# Patient Record
Sex: FEMALE | Race: WHITE | NOT HISPANIC OR LATINO | Employment: FULL TIME | URBAN - METROPOLITAN AREA
[De-identification: names, ages, dates, MRNs, and addresses within clinical notes are randomized per-mention and may not be internally consistent; named-entity substitution may affect disease eponyms.]

---

## 2023-08-03 ENCOUNTER — OFFICE VISIT (OUTPATIENT)
Dept: OBSTETRICS AND GYNECOLOGY | Facility: CLINIC | Age: 48
End: 2023-08-03
Payer: COMMERCIAL

## 2023-08-03 VITALS
HEIGHT: 64 IN | SYSTOLIC BLOOD PRESSURE: 118 MMHG | DIASTOLIC BLOOD PRESSURE: 62 MMHG | WEIGHT: 151.4 LBS | BODY MASS INDEX: 25.85 KG/M2

## 2023-08-03 DIAGNOSIS — Z01.419 WOMEN'S ANNUAL ROUTINE GYNECOLOGICAL EXAMINATION: Primary | ICD-10-CM

## 2023-08-03 DIAGNOSIS — N92.1 BREAKTHROUGH BLEEDING: ICD-10-CM

## 2023-08-03 PROCEDURE — 99386 PREV VISIT NEW AGE 40-64: CPT | Performed by: NURSE PRACTITIONER

## 2023-08-03 RX ORDER — SEMAGLUTIDE 2.4 MG/.75ML
2.4 INJECTION, SOLUTION SUBCUTANEOUS
COMMUNITY
Start: 2023-04-19

## 2023-08-03 RX ORDER — MULTIVIT AND MINERALS-FERROUS GLUCONATE 9 MG IRON/15 ML ORAL LIQUID 9 MG/15 ML
LIQUID (ML) ORAL DAILY
COMMUNITY

## 2023-08-03 RX ORDER — HYDROCHLOROTHIAZIDE 12.5 MG/1
12.5 CAPSULE, GELATIN COATED ORAL EVERY MORNING
COMMUNITY
Start: 2023-04-06

## 2023-08-11 LAB — REF LAB TEST METHOD: NORMAL

## 2023-08-21 ENCOUNTER — OFFICE VISIT (OUTPATIENT)
Dept: OBSTETRICS AND GYNECOLOGY | Facility: CLINIC | Age: 48
End: 2023-08-21
Payer: COMMERCIAL

## 2023-08-21 VITALS
SYSTOLIC BLOOD PRESSURE: 110 MMHG | WEIGHT: 149.8 LBS | DIASTOLIC BLOOD PRESSURE: 80 MMHG | HEIGHT: 64 IN | BODY MASS INDEX: 25.57 KG/M2

## 2023-08-21 DIAGNOSIS — N92.0 MENORRHAGIA WITH REGULAR CYCLE: ICD-10-CM

## 2023-08-21 DIAGNOSIS — D25.1 INTRAMURAL LEIOMYOMA OF UTERUS: ICD-10-CM

## 2023-08-21 DIAGNOSIS — N93.9 ABNORMAL UTERINE BLEEDING (AUB): Primary | ICD-10-CM

## 2023-08-21 PROCEDURE — 58100 BIOPSY OF UTERUS LINING: CPT | Performed by: OBSTETRICS & GYNECOLOGY

## 2023-08-21 PROCEDURE — 99214 OFFICE O/P EST MOD 30 MIN: CPT | Performed by: OBSTETRICS & GYNECOLOGY

## 2023-08-21 RX ORDER — TRANEXAMIC ACID 650 MG/1
TABLET ORAL
Qty: 30 TABLET | Refills: 12 | Status: SHIPPED | OUTPATIENT
Start: 2023-08-21

## 2023-08-21 NOTE — PROGRESS NOTES
Gynecologic Procedure Note        Endometrial Biopsy      Indications:The pt is a 47 y.o. , who presents today for endometrial biopsy.  The patient was noted to have Menorrhagia.  Her LMP is Patient's last menstrual period was 2023 (exact date). .    After being presented with the risk, benefits, and specific detail of the procedure, the patient wished to proceed.  Written consent was obtained from patient.   Urine pregnancy test was Not indicated.      Procedure Details   The patient was placed on the table in the dorsal lithotomy position.  She was draped in the appropriate manner.  A speculum was placed in the vagina.  The cervix was visualized and prepped with Betadine.  A tenaculum was placed on the anterior lip of the cervix for traction.  A small plastic 5 mm Pipelle syringe curette was inserted into the cervical canal.  The uterus was sounded to 9 cm's.  A vigorous four quadrant biopsy was performed, removing a medium amount of tissue.  The tissue was placed in Formalin and sent to Pathology.  The patient tolerated the procedure very well and she reported mild cramping.  The tenaculum was removed from the cervix and the speculum was removed.  The patient was observed for 5 minutes.           Complications: none.        Roxanne White MD  2023    good, to achieve stated therapy goals

## 2023-08-21 NOTE — PROGRESS NOTES
Gynecologic Exam Note       CC:  AUB    Subjective   HPI  Delia Ann is a 47 y.o. female, , who presents for follow up evaluation of Menorrhagia with some occasional BTB with TVUS. She would also like to discuss an ablation today.      Patient was last seen on 8/3/23 by Ashanti ANTONY.   Patient states on occasion she will have spotting that is dark red-brown in color that lasts for 6 days one week after her period stops.   This has only happened a couple times.  She was also interested in discussing an ablation with an MD. She was not interested in hormonal contraception for bleeding control at that time. The plan was for her to return today for a TVUS and to see MD to discuss ablation.   She reports 3 heavy days of bleeding with clots.  She uses super plus tampons and soaks those Q2-3 hours.    The patient reports additional symptoms as none.      Patient's last menstrual period was 2023 (exact date)..  Periods are regular every 28-30 days, lasting 3- 4 days.  Partner Status: Marital Status: .  New Partners since last visit: no.  Desires STD Screening: no.    Additional OB/GYN History   Current contraception: contraceptive methods: Tubal ligation  Desires to: do not start contraception  Last Pap : 23-negative, HPV pool positive, HPV 16/18/45 negative   Last Completed Pap Smear            PAP SMEAR (Every 3 Years) Next due on 8/3/2026      2023  LIQUID-BASED PAP SMEAR WITH HPV GENOTYPING REGARDLESS OF INTERPRETATION ([facility])                  History of abnormal Pap smear: yes - HPV pool positive on 23-plan to repeat pap next year   Last mammogram: 22- normal   Last Completed Mammogram            MAMMOGRAM (Yearly) Next due on 2022  Mammo Screening Digital Tomosynthesis Bilateral With CAD    2020  Mammo Screening Digital Tomosynthesis Bilateral With CAD    2019  Mammo Screening Bilateral With CAD    2018  Mammo Screening  "Bilateral With CAD    2017  Mammo Screening Digital Tomosynthesis Bilateral With CAD                  Tobacco Usage?: No   OB History          3    Para   3    Term   3            AB        Living             SAB        IAB        Ectopic        Molar        Multiple        Live Births                    Health Maintenance   Topic Date Due    COLORECTAL CANCER SCREENING  Never done    COVID-19 Vaccine (1) Never done    HEPATITIS C SCREENING  Never done    ANNUAL PHYSICAL  Never done    TDAP/TD VACCINES (2 - Td or Tdap) 2023    MAMMOGRAM  2023    INFLUENZA VACCINE  10/01/2023    Annual Gynecologic Pelvic and Breast Exam  2024    PAP SMEAR  2026    Pneumococcal Vaccine 0-64  Aged Out         Past Medical History:   Diagnosis Date    Preeclampsia 1995     Past Surgical History:   Procedure Laterality Date    GALLBLADDER SURGERY  1998    TUBAL ABDOMINAL LIGATION  2011    WISDOM TOOTH EXTRACTION  ??       The additional following portions of the patient's history were reviewed and updated as appropriate: allergies, current medications, past family history, past medical history, past social history, past surgical history, and problem list.    Review of Systems   Genitourinary:  Positive for menstrual problem.     I have reviewed and agree with the HPI, ROS, and historical information as entered above. Roxanne White MD    Objective   /80   Ht 162.6 cm (64.02\")   Wt 67.9 kg (149 lb 12.8 oz)   LMP 2023 (Exact Date)   BMI 25.70 kg/mý     Physical Exam  Vitals and nursing note reviewed. Exam conducted with a chaperone present.   Constitutional:       Appearance: Normal appearance.   HENT:      Head: Normocephalic and atraumatic.   Pulmonary:      Effort: Pulmonary effort is normal. No accessory muscle usage or respiratory distress.   Genitourinary:     General: Normal vulva.      Exam position: Lithotomy position.      Labia:         Right: No rash, " tenderness, lesion or injury.         Left: No rash, tenderness, lesion or injury.       Urethra: No prolapse, urethral swelling or urethral lesion.      Vagina: Normal. No signs of injury and foreign body. No vaginal discharge, erythema, tenderness, bleeding or lesions.      Cervix: No cervical motion tenderness, discharge, friability, lesion, erythema or cervical bleeding.   Neurological:      Mental Status: She is alert.       Assessment & Plan     Assessment     Problem List Items Addressed This Visit    None  Visit Diagnoses       Abnormal uterine bleeding (AUB)    -  Primary    Relevant Medications    Tranexamic Acid (Lysteda) 650 MG tablet    Other Relevant Orders    TISSUE EXAM, [facility] ([facility])    Menorrhagia with regular cycle        Intramural leiomyoma of uterus                  Plan     Return for Next scheduled follow up.  Patient with regular cycles and menorrhagia.  She has had 2-3 episodes of breakthrough bleeding, however, typically her cycles are regular and without breakthrough bleeding.  She is noted for many years that her periods have worsened and she has menorrhagia with clots.  She underwent transvaginal ultrasound today which did show an approximately 3 cm right sided subserosal fibroid.  Secondary to her menorrhagia, we proceeded with endometrial biopsy today which was uncomplicated.  Patient would like to avoid any hormonal treatment.  These were reviewed with her today as well as the option for trial of Lysteda.  We also reviewed endometrial ablation in detail including the surgical procedure, risk for stenosis, risk for hematometrium.  We discussed that with her fibroid she may not have optimal results and may still have heavier periods.  She is considering options for proceeding with ablation versus a trial of Lysteda.  We will contact her with biopsy results.  She will contact us when she has decided if she would like to proceed with surgery.  Patient is status post bilateral  tubal ligation  Risks of thromboembolism with Lysteda was reviewed with her today.      Roxanne White MD  08/21/2023

## 2023-08-23 LAB — REF LAB TEST METHOD: NORMAL

## 2023-09-25 ENCOUNTER — TELEPHONE (OUTPATIENT)
Dept: OBSTETRICS AND GYNECOLOGY | Facility: CLINIC | Age: 48
End: 2023-09-25

## 2023-09-26 NOTE — TELEPHONE ENCOUNTER
I had spoke to pt yesterday morning (9/25) about this, was in the process of speaking with OR/ To reschedule. Spoke with pt this morning and rescheduled sx for 10/17.  
PROVIDER: DR. CONROY    Caller: Delia Ann    Relationship to patient: Self    Best call back number: [phone]    PT WANTS TO CANCEL PROCEDURE FOR TOMORROW. SHE HAS TRIED TO REACH THE OFFICE TO NO AVAIL. I AM UNABLE TO REACH OFFICE AS WELL. SHE CANCELLED IT ON HER MYCHART. COULD SOMEONE PLS CONTACT HER?  
22

## 2023-10-16 ENCOUNTER — OFFICE VISIT (OUTPATIENT)
Dept: OBSTETRICS AND GYNECOLOGY | Facility: CLINIC | Age: 48
End: 2023-10-16
Payer: COMMERCIAL

## 2023-10-16 VITALS
BODY MASS INDEX: 24.55 KG/M2 | WEIGHT: 143.8 LBS | DIASTOLIC BLOOD PRESSURE: 74 MMHG | HEIGHT: 64 IN | SYSTOLIC BLOOD PRESSURE: 118 MMHG

## 2023-10-16 DIAGNOSIS — N92.0 MENORRHAGIA WITH REGULAR CYCLE: Primary | ICD-10-CM

## 2023-10-16 PROCEDURE — 99213 OFFICE O/P EST LOW 20 MIN: CPT | Performed by: OBSTETRICS & GYNECOLOGY

## 2023-10-16 RX ORDER — IBUPROFEN 800 MG/1
800 TABLET ORAL EVERY 8 HOURS PRN
Qty: 30 TABLET | Refills: 0 | Status: SHIPPED | OUTPATIENT
Start: 2023-10-16

## 2023-10-16 RX ORDER — HYDROCODONE BITARTRATE AND ACETAMINOPHEN 5; 325 MG/1; MG/1
1-2 TABLET ORAL EVERY 4 HOURS PRN
Qty: 12 TABLET | Refills: 0 | Status: SHIPPED | OUTPATIENT
Start: 2023-10-16 | End: 2023-10-21

## 2023-10-16 NOTE — PROGRESS NOTES
Gynecologic Preoperative Exam Note        Subjective   Delia Ann is a 48 y.o. year old  who is scheduled for surgery due to AUB.  She is scheduled for a hysteroscopy D&C with endometrial ablation at [facility].  Her surgery is scheduled for 10/17/23 at 1:00p.m.   Her LMP was 10/8/23.  Her birth control method is tubal ligation,  Her BMI is 24.67.      She has reviewed the informational pamphlet on hysteroscopy and ablation.    She understands the risks of bleeding, infection, possible damage to other organ systems, including but not limited to the gastrointestinal tract and genitourinary tract.  She also understands the specific risks listed in the preop information (video, pamphlets, etc.).    She has reviewed and signed the preop consent form.    She has been instructed to have a light dinner the night before surgery, then nothing to eat or drink after midnight.  The day of surgery do not chew gum or smoke.  Remove all jewelry, nail polish, contact lenses prior to coming to the hospital.  Do not bring valuables or large sums of money with you. Patient was instructed on what time to arrive and where to check in, maps were given.  She was instructed that she will meet an Anesthesiologist and that an IV will be started to provide fluids and sedation.  The total time of procedure was discussed.  She was instructed that she will need a .      She has confirmed that she is not allergic to Latex.       Past Medical History:   Diagnosis Date    Preeclampsia 1995     Past Surgical History:   Procedure Laterality Date    GALLBLADDER SURGERY  1998    TUBAL ABDOMINAL LIGATION  2011    WISDOM TOOTH EXTRACTION  ??     OB History    Para Term  AB Living   3 3 3 0 0 0   SAB IAB Ectopic Molar Multiple Live Births   0 0 0 0 0 0      # Outcome Date GA Lbr Aman/2nd Weight Sex Delivery Anes PTL Lv   3 Term      Vag-Spont      2 Term      Vag-Spont      1 Term      Vag-Spont        Social History  "    Tobacco Use   Smoking Status Never   Smokeless Tobacco Not on file     Social History     Substance and Sexual Activity   Alcohol Use Not Currently    Alcohol/week: 1.0 standard drink of alcohol    Types: 1 Glasses of wine per week     Social History     Substance and Sexual Activity   Drug Use Never     Prior to Admission medications    Medication Sig Start Date End Date Taking? Authorizing Provider   hydroCHLOROthiazide (MICROZIDE) 12.5 MG capsule Take 1 capsule by mouth Every Morning. 4/6/23   Sebastian Barreto MD   Multiple Vitamins-Minerals (multivitamin and minerals) liquid liquid Take  by mouth Daily.    Sebastian Barreto MD   Tranexamic Acid (Lysteda) 650 MG tablet Take 2 tablets by mouth 3 times daily for up to 5 days only while actively bleeding 8/21/23   Roxanne White MD Wegovy 2.4 MG/0.75ML solution auto-injector Inject 2.4 mg under the skin into the appropriate area as directed Every 7 (Seven) Days. 4/19/23   Sebastian Barreto MD     No Known Allergies    Review of Systems      Objective   /74   Ht 162.6 cm (64.02\")   Wt 65.2 kg (143 lb 12.8 oz)   LMP 10/08/2023 (Exact Date)   BMI 24.67 kg/m²   General: well developed; well nourished  no acute distress alert and oriented x 3   Heart: regular rate and rhythm, S1, S2 normal, no murmur, click, rub or gallop   Lungs: breathing is unlabored  clear to auscultation bilaterally   Abdomen: soft, non-tender; no masses   Pelvis: Not performed.          Assessment   Problems Addressed this Visit    None  Visit Diagnoses       Menorrhagia with regular cycle    -  Primary    Relevant Medications    ibuprofen (ADVIL,MOTRIN) 800 MG tablet    HYDROcodone-acetaminophen (NORCO) 5-325 MG per tablet          Diagnoses         Codes Comments    Menorrhagia with regular cycle    -  Primary ICD-10-CM: N92.0  ICD-9-CM: 626.2                  Plan   Pt with menorrhagia, desires to proceed with hysteroscopy, D&C and Novasure Endometrial Ablation.  Pt " s/p BTL, EMB negative.  Risks of surgery were reviewed with the patient including risks of bleeding, infection, damage to other organ systems including, but not limited to GI and  tracts (bowel, bladder, blood vessels, nerves) risks of Anesthesia, as well as the risk the surgery will not produce the desired results, possible need for additional surgery, death, risk of uterine perforation.  Jose has been obtained and reviewed   Pain Medication Consent Form has been signed.  A review regarding proper medication administration, impact on driving and working while medicated, the safety of use in pregnancy, the potential for overdose and the proper disposal and storage of controlled medications has been done with the patient.          Roxanne White MD  10/16/2023

## 2023-10-17 ENCOUNTER — LAB REQUISITION (OUTPATIENT)
Dept: LAB | Facility: HOSPITAL | Age: 48
End: 2023-10-17
Payer: COMMERCIAL

## 2023-10-17 ENCOUNTER — OUTSIDE FACILITY SERVICE (OUTPATIENT)
Dept: OBSTETRICS AND GYNECOLOGY | Facility: CLINIC | Age: 48
End: 2023-10-17
Payer: COMMERCIAL

## 2023-10-17 DIAGNOSIS — N92.0 EXCESSIVE AND FREQUENT MENSTRUATION WITH REGULAR CYCLE: ICD-10-CM

## 2023-10-17 PROCEDURE — 88342 IMHCHEM/IMCYTCHM 1ST ANTB: CPT

## 2023-10-17 PROCEDURE — 88305 TISSUE EXAM BY PATHOLOGIST: CPT

## 2023-10-17 PROCEDURE — 88341 IMHCHEM/IMCYTCHM EA ADD ANTB: CPT

## 2023-10-19 LAB — REF LAB TEST METHOD: NORMAL

## 2023-10-19 NOTE — PROGRESS NOTES
Please call patient about results.  Pt s/p Ablation.  Her pathology from her uterus is normal, but we got sampling from the inside of her cervix which is abnormal.  She had high grade dysplasia noted on her specimen (severe cervical dysplasia).  This is precancerous.  She will need a LEEP for this.  We can discuss further at her postop.

## 2023-10-30 ENCOUNTER — OFFICE VISIT (OUTPATIENT)
Dept: OBSTETRICS AND GYNECOLOGY | Facility: CLINIC | Age: 48
End: 2023-10-30
Payer: COMMERCIAL

## 2023-10-30 VITALS
BODY MASS INDEX: 24.96 KG/M2 | DIASTOLIC BLOOD PRESSURE: 64 MMHG | HEIGHT: 64 IN | WEIGHT: 146.2 LBS | SYSTOLIC BLOOD PRESSURE: 100 MMHG

## 2023-10-30 DIAGNOSIS — B97.7 HPV IN FEMALE: ICD-10-CM

## 2023-10-30 DIAGNOSIS — D06.9 HIGH GRADE SQUAMOUS INTRAEPITHELIAL LESION (HGSIL), GRADE 3 CIN, ON BIOPSY OF CERVIX: ICD-10-CM

## 2023-10-30 DIAGNOSIS — Z48.89 POSTOPERATIVE VISIT: Primary | ICD-10-CM

## 2023-10-30 NOTE — PROGRESS NOTES
"     OBGYN Postoperative Exam Note          Subjective   Chief Complaint   Patient presents with    Post-op Follow-up     Delia Ann is a 48 y.o. year old  presenting to be seen for her post-operative visit. Patient underwent a Hysteroscopy, D&C and Novasure Ablation on 10/17/2023 at [facility] for Menorrhagia.  Currently she reports no problems with eating, bowel movements, voiding, or wound drainage and pain is well controlled.    The pathology results from her procedure are in Delia's record and are normal.      OTHER THINGS SHE WANTS TO DISCUSS TODAY:  Nothing else    The following portions of the patient's history were reviewed and updated as appropriate:current medications and allergies       Objective   /64   Ht 162.6 cm (64.02\")   Wt 66.3 kg (146 lb 3.2 oz)   LMP 10/08/2023 (Exact Date)   BMI 25.08 kg/m²     General:  well developed; well nourished  no acute distress   Abdomen: soft, non-tender; no masses   Pelvis: Clinical staff was present for exam  External genitalia:  normal appearance of the external genitalia including Bartholin's and La Barge's glands.  :  urethral meatus normal;  Vaginal:  normal pink mucosa without prolapse or lesions.  Cervix:  normal appearance. Well healed  Uterus:  normal size, shape and consistency. anteverted;  Adnexa:  normal bimanual exam of the adnexa.          Assessment   S/P endometrial ablation, hysteroscopy, and D&C  Problems Addressed this Visit    None  Visit Diagnoses       Postoperative visit    -  Primary    High grade squamous intraepithelial lesion (HGSIL), grade 3 JONATHAN, on biopsy of cervix        HPV in female              Diagnoses         Codes Comments    Postoperative visit    -  Primary ICD-10-CM: Z48.89  ICD-9-CM: V58.49     High grade squamous intraepithelial lesion (HGSIL), grade 3 JONATHAN, on biopsy of cervix     ICD-10-CM: D06.9  ICD-9-CM: 233.1     HPV in female     ICD-10-CM: B97.7  ICD-9-CM: 079.4                Plan   May return to " full activity with no restrictions  The importance of keeping all planned follow-up and taking all medications as prescribed was emphasized.  Return for LEEP on 11/9/23.             Roxanne White MD  10/30/2023

## 2024-02-15 ENCOUNTER — OFFICE VISIT (OUTPATIENT)
Dept: OBSTETRICS AND GYNECOLOGY | Facility: CLINIC | Age: 49
End: 2024-02-15
Payer: COMMERCIAL

## 2024-02-15 VITALS
HEIGHT: 64 IN | BODY MASS INDEX: 25.61 KG/M2 | SYSTOLIC BLOOD PRESSURE: 102 MMHG | DIASTOLIC BLOOD PRESSURE: 60 MMHG | WEIGHT: 150 LBS

## 2024-02-15 DIAGNOSIS — D06.9 HIGH GRADE SQUAMOUS INTRAEPITHELIAL LESION (HGSIL), GRADE 3 CIN, ON BIOPSY OF CERVIX: Primary | ICD-10-CM

## 2024-02-16 ENCOUNTER — TELEPHONE (OUTPATIENT)
Dept: OBSTETRICS AND GYNECOLOGY | Facility: CLINIC | Age: 49
End: 2024-02-16
Payer: COMMERCIAL

## 2024-02-16 DIAGNOSIS — C53.9 MALIGNANT NEOPLASM OF CERVIX, UNSPECIFIED SITE: Primary | ICD-10-CM

## 2024-02-19 LAB — REF LAB TEST METHOD: NORMAL

## 2024-02-23 ENCOUNTER — TELEPHONE (OUTPATIENT)
Dept: GYNECOLOGIC ONCOLOGY | Facility: CLINIC | Age: 49
End: 2024-02-23

## 2024-02-23 ENCOUNTER — PREP FOR SURGERY (OUTPATIENT)
Dept: OTHER | Facility: HOSPITAL | Age: 49
End: 2024-02-23
Payer: COMMERCIAL

## 2024-02-23 ENCOUNTER — OFFICE VISIT (OUTPATIENT)
Dept: GYNECOLOGIC ONCOLOGY | Facility: CLINIC | Age: 49
End: 2024-02-23
Payer: COMMERCIAL

## 2024-02-23 VITALS
RESPIRATION RATE: 18 BRPM | HEART RATE: 81 BPM | SYSTOLIC BLOOD PRESSURE: 125 MMHG | BODY MASS INDEX: 26.12 KG/M2 | HEIGHT: 64 IN | WEIGHT: 153 LBS | OXYGEN SATURATION: 96 % | TEMPERATURE: 98 F | DIASTOLIC BLOOD PRESSURE: 69 MMHG

## 2024-02-23 DIAGNOSIS — C53.9 MALIGNANT NEOPLASM OF CERVIX, UNSPECIFIED SITE: Primary | ICD-10-CM

## 2024-02-23 RX ORDER — HEPARIN SODIUM 5000 [USP'U]/ML
5000 INJECTION, SOLUTION INTRAVENOUS; SUBCUTANEOUS ONCE
OUTPATIENT
Start: 2024-02-23 | End: 2024-02-23

## 2024-02-23 RX ORDER — PREGABALIN 150 MG/1
150 CAPSULE ORAL ONCE
OUTPATIENT
Start: 2024-02-23 | End: 2024-02-23

## 2024-02-23 RX ORDER — SODIUM CHLORIDE 0.9 % (FLUSH) 0.9 %
10 SYRINGE (ML) INJECTION AS NEEDED
OUTPATIENT
Start: 2024-02-23

## 2024-02-23 RX ORDER — SODIUM CHLORIDE 9 MG/ML
40 INJECTION, SOLUTION INTRAVENOUS AS NEEDED
OUTPATIENT
Start: 2024-02-23

## 2024-02-23 RX ORDER — CELECOXIB 200 MG/1
200 CAPSULE ORAL ONCE
OUTPATIENT
Start: 2024-02-23 | End: 2024-02-23

## 2024-02-23 RX ORDER — SODIUM CHLORIDE 0.9 % (FLUSH) 0.9 %
10 SYRINGE (ML) INJECTION EVERY 12 HOURS SCHEDULED
OUTPATIENT
Start: 2024-02-23

## 2024-02-23 RX ORDER — ACETAMINOPHEN 500 MG
1000 TABLET ORAL ONCE
OUTPATIENT
Start: 2024-02-23 | End: 2024-02-23

## 2024-02-23 RX ORDER — SCOLOPAMINE TRANSDERMAL SYSTEM 1 MG/1
1 PATCH, EXTENDED RELEASE TRANSDERMAL CONTINUOUS
OUTPATIENT
Start: 2024-02-23 | End: 2024-02-26

## 2024-02-23 NOTE — PROGRESS NOTES
Delia Ann  [id number]  1975      Reason for visit:  Newly diagnosed cervical cancer    Consultation:  Patient is being seen at the request of Dr. White.     History of present illness:  The patient is a 48 y.o. year old female who presents today for treatment and evaluation of the above issues.    She had a normal pap test in August, has always had normal pap tests. In October, she had an endometrial ablation for irregular bleeding. However, pathology from that procedure demonstrated some abnormal cells. She had a LEEP performed 2/15 (delayed by illness). Pathology from this demonstrated invasive moderately differentiated squamous cell carcinoma. She has not had any bleeding since her ablation. She was taking Wegovy, last took a shot last month.     She has a negative medical history. She reports no known history of cancer other than a maternal aunt diagnosed with breast cancer >60 years of age. She does not smoke. She has not had a colonoscopy. Last had a mammogram 2022.     For new patients, [facility] intake form from today was reviewed and confirmed.    OBGYN History:  She is a .  She does not use HRT. She does not have a history of abnormal pap smears.    Oncologic History:  Oncology/Hematology History   Cervical cancer   2/15/2024 Cancer Staged    Staging form: ONC CERVIX UTERI AJCC V9  - Clinical stage from 2/15/2024: FIGO Stage IA1 (cT1a1, cN0, cM0) - Signed by Alexia Rowe MD on 2024  Staging comments: Diagnosed on LEEP  2 mm with a 1 mm depth of invasion  PD-L1 CPS 90     2024 Initial Diagnosis    Cervical cancer           Past Medical History:   Diagnosis Date    Cervical cancer 2024    Preeclampsia 1995       Past Surgical History:   Procedure Laterality Date    GALLBLADDER SURGERY  1998    TUBAL ABDOMINAL LIGATION  2011    WISDOM TOOTH EXTRACTION  ??       MEDICATIONS:    Current Outpatient Medications:     hydroCHLOROthiazide (MICROZIDE) 12.5 MG capsule,  "Take 1 capsule by mouth Every Morning., Disp: , Rfl:     Multiple Vitamins-Minerals (multivitamin and minerals) liquid liquid, Take  by mouth Daily., Disp: , Rfl:     Wegovy 2.4 MG/0.75ML solution auto-injector, Inject 2.4 mg under the skin into the appropriate area as directed Every 7 (Seven) Days., Disp: , Rfl:      Allergies:  has No Known Allergies.    Social History:   Social History     Socioeconomic History    Marital status:    Tobacco Use    Smoking status: Never     Passive exposure: Never    Smokeless tobacco: Never   Vaping Use    Vaping Use: Never used   Substance and Sexual Activity    Alcohol use: Not Currently     Alcohol/week: 1.0 standard drink of alcohol     Types: 1 Glasses of wine per week    Drug use: Never    Sexual activity: Yes     Partners: Male     Birth control/protection: Tubal ligation       Family History:    Family History   Problem Relation Age of Onset    Stroke Mother     Diabetes Paternal Grandmother     Breast cancer Maternal Aunt        Health Maintenance:    Health Maintenance   Topic Date Due    COLORECTAL CANCER SCREENING  Never done    COVID-19 Vaccine (1) Never done    INFLUENZA VACCINE  Never done    HEPATITIS C SCREENING  Never done    ANNUAL PHYSICAL  Never done    TDAP/TD VACCINES (2 - Td or Tdap) 08/09/2023    MAMMOGRAM  09/16/2023    BMI FOLLOWUP  04/19/2024    PAP SMEAR  08/03/2026    Pneumococcal Vaccine 0-64  Aged Out       Review of Systems:  Please refer to history of present illness.  Review of systems otherwise negative.    Physical Exam:  Vitals:    02/23/24 1055   BP: 125/69   Pulse: 81   Resp: 18   Temp: 98 °F (36.7 °C)   TempSrc: Temporal   SpO2: 96%   Weight: 69.4 kg (153 lb)   Height: 162.6 cm (64.02\")   PainSc: 0-No pain     Body mass index is 26.25 kg/m².  Wt Readings from Last 3 Encounters:   02/23/24 69.4 kg (153 lb)   02/15/24 68 kg (150 lb)   10/30/23 66.3 kg (146 lb 3.2 oz)     GENERAL: Alert, well-appearing female appearing her stated age " who is in no apparent distress.   HEENT: Sclera anicteric. Head normocephalic, atraumatic. Mucus membranes moist.   NECK: Trachea midline, supple, without masses.  No thyromegaly.   BREASTS: Deferred  CARDIOVASCULAR: Normal rate, regular rhythm, no murmurs, rubs, or gallops.  No peripheral edema.  RESPIRATORY: Clear to auscultation bilaterally, normal respiratory effort  BACK:  No CVA tenderness, no vertebral tenderness on palpation  GASTROINTESTINAL:  Abdomen is soft, non-tender, non-distended, no rebound or guarding, no masses, or hernias. No HSM.   SKIN:  Warm, dry, well-perfused.  All visible areas intact.  No rashes, lesions, ulcers.  PSYCHIATRIC: AO x3, with appropriate affect, normal thought processes.  NEUROLOGIC: No focal deficits.  Moves extremities well.  MUSCULOSKELETAL: Normal gait and station.   EXTREMITIES:   No cyanosis, clubbing, symmetric.  LYMPHATICS:  No cervical or inguinal adenopathy noted.     PELVIC exam:  deferred    ECOG PS 0    PROCEDURES:  none    Diagnostic Data:    No Images in the past 120 days found..    Lab Results   Component Value Date    WBC 6.2 10/13/2020    HGB 12.2 10/13/2020    HCT 36.9 10/13/2020    MCV 96.9 10/13/2020     10/13/2020    NEUTROABS 2.9 10/13/2020    BUN 11 10/13/2020    CREATININE 0.92 10/13/2020    EGFRIFNONA 75 10/13/2020    EGFRIFAFRI 86 10/13/2020     10/13/2020    K 4.1 10/13/2020     10/13/2020    CO2 28 10/13/2020    CALCIUM 9.4 10/13/2020    ALBUMIN 4.3 10/13/2020    AST 15 10/13/2020    ALT 10 10/13/2020    BILITOT 0.2 10/13/2020   PATHOLOGY:  DIAGNOSIS:  A.   CERVIX, CONIZATION, LEEP, TOP HALF:  Invasive moderately differentiated squamous cell carcinoma in a  background of extensive high-grade squamous intraepithelial lesion  extending into endocervical glands  Invasive carcinoma measures 2 mm wide with a depth of invasion of 1 mm  All margins free of invasive carcinoma  HSIL present at endocervical and deep margins via  "endocervical  glandular extension  B.   CERVIX, CONIZATION, LEEP, TOP HAT:  Cervical transformation zone with high-grade squamous intraepithelial  lesion, extending into endocervical glands  Negative for invasive carcinoma  HSIL present at ectocervical mucosal margin and deep margin via  endocervical glandular extension  C.   CERVIX, CONIZATION, LEEP, BOTTOM HAT:  Cervical transformation zone with high-grade squamous intraepithelial lesion  Negative for invasive carcinoma  All margins benign  PDL CPS 90  No results found for: \"\"      Assessment & Plan   This is a 48 y.o. woman with newly diagnosed 1A1 cervical cancer.     No diagnosis found.    Discussed at length with patient that given negative margins and her being a good surgical candidate, would recommend a simple hysterectomy at this time. Patient was consented for robotic assisted total laparoscopic hysterectomy, bilateral salpingectomy with ovarian retention.    Risks and benefits of surgery were discussed.  This included, but was not limited to, infection and bleeding like when the skin is cut; damage to surrounding structures; and incisional complications.  Risk of DVT was addressed for major surgeries.  Standard of care efforts to minimize these risks were reviewed.  Typical hospital stay and recovery were discussed as well as post-procedure precautions.  Surgical implications of chronic illnesses on recovery and surgical outcome were reviewed.   Pain medication regimen for postoperative care was discussed.  Typical regimen and avoidance of narcotics was discussed.  Patient was educated that other factors, such as existing narcotic use, can impact postoperative pain management.    Patient verbalized understanding of the plan including the risks and benefits. Will see back in clinic in 3 weeks for preoperative pelvic exam, which was deferred due to her having LEEP performed 2/15/24.  She would like her surgery postponed until the week before spring " break as she works as a  at a school and with minimal disruption in her job if possible.  I think this is very reasonable given the timeframe of recovery from lead to hysterectomy.  Dr. White and Dr. Rowe personally communicated regarding the care of this patient.  Pain assessment was performed today as a part of patient’s care.  For patients with pain related to surgery, gynecologic malignancy or cancer treatment, the plan is as noted in the assessment/plan.  For patients with pain not related to these issues, they are to seek any further needed care from a more appropriate provider, such as PCP.      No orders of the defined types were placed in this encounter.    FOLLOW UP: Surgery    I spent 60 minutes caring for Delia on this date of service. This time includes time spent by me in the following activities: preparing for the visit, reviewing tests, performing a medically appropriate examination and/or evaluation, counseling and educating the patient/family/caregiver, ordering medications, tests, or procedures, referring and communicating with other health care professionals, documenting information in the medical record, and care coordination    Patient was seen and examined with Dr. Parker,  resident, who performed portions of the examination and documentation for this patient's care under my direct supervision.  I agree with the above documentation and plan.    Alexia Rowe MD  02/24/24  13:27 EST

## 2024-02-23 NOTE — TELEPHONE ENCOUNTER
Caller: Delia Ann    Relationship: Self    Best call back number: [phone]    What is the best time to reach you: ANYTIME    Who are you requesting to speak with (clinical staff, provider,  specific staff member): CLINICAL    What was the call regarding: PT WILL BE SCHEDULED FOR A HYSTERECTOMY IN THERE FUTURE AND IS WANTING TO NO IF SOMEONE WILL NEED TO BE WITH HER THE ENTIRE TIME OR CAN SHE BE DROPPED OFF

## 2024-02-23 NOTE — TELEPHONE ENCOUNTER
RN called patient and answered her pre operative question regarding having someone at the hospital during surgery. Patient verbalized understanding.    Self

## 2024-02-24 ENCOUNTER — PREP FOR SURGERY (OUTPATIENT)
Dept: OTHER | Facility: HOSPITAL | Age: 49
End: 2024-02-24
Payer: COMMERCIAL

## 2024-03-13 NOTE — PROGRESS NOTES
Delia Ann  [id number]  1975      Reason for visit:  Newly diagnosed cervical cancer     Consultation:  Patient is being seen at the request of Dr. White.     History of present illness:  Delia Ann  is a 48 y.o. year old female who presents today for evaluation.  Patient was last seen in the office by Dr. Rowe on 2024 for newly diagnosed stage Ia 1 cervical carcinoma diagnosed on a LEEP specimen.  LEEP was performed on February 15.  She presents today for a pre-op pelvic exam given that exam was not performed due to recent LEEP procedure.  Patient is currently scheduled for surgery on .    She had a normal pap test in August, has always had normal pap tests. In October, she had an endometrial ablation for irregular bleeding. However, pathology from that procedure demonstrated some abnormal cells. She had a LEEP performed 2/15 (delayed by illness). Pathology from this demonstrated invasive moderately differentiated squamous cell carcinoma. She has not had any bleeding since her ablation. She was taking Wegovy, last took a shot .  Patient was counseled to proceed forward with robotic assisted total laparoscopic hysterectomy, bilateral salpingectomy with ovarian retention.  Surgery is scheduled for 3/28/2024.      Patient started having some yellowish vaginal discharge over last 2 wks; occasional brownish.  No odor.   Noticing some pelvic cramping over last several days as well.  Not needing any pain medications.  No fevers/chills.  Normal bowel and bladder function.   No vaginal bleeding.       She has a negative medical history. She reports no known history of cancer other than a maternal aunt diagnosed with breast cancer >60 years of age. She does not smoke. She has not had a colonoscopy. Last had a mammogram 2022.     For new patients, [facility] intake form from today was reviewed and confirmed.    OBGYN History:  She is a .  She does not use HRT. She does not have a  history of abnormal pap smears.    Oncologic History:  Oncology/Hematology History   Cervical cancer   2/15/2024 Cancer Staged    Staging form: ONC CERVIX UTERI AJCC V9  - Clinical stage from 2/15/2024: FIGO Stage IA1 (cT1a1, cN0, cM0) - Signed by Alexia Rowe MD on 2/23/2024  Staging comments: Diagnosed on LEEP  2 mm with a 1 mm depth of invasion  PD-L1 CPS 90     2/23/2024 Initial Diagnosis    Cervical cancer           Past Medical History:   Diagnosis Date    Cervical cancer 2/23/2024    Preeclampsia 11/1995       Past Surgical History:   Procedure Laterality Date    GALLBLADDER SURGERY  07/1998    TUBAL ABDOMINAL LIGATION  07/2011    WISDOM TOOTH EXTRACTION  ??       MEDICATIONS:    Current Outpatient Medications:     hydroCHLOROthiazide (MICROZIDE) 12.5 MG capsule, Take 1 capsule by mouth Every Morning., Disp: , Rfl:     Multiple Vitamins-Minerals (multivitamin and minerals) liquid liquid, Take  by mouth Daily., Disp: , Rfl:     doxycycline (VIBRAMYICN) 100 MG tablet, Take 1 tablet by mouth 2 (Two) Times a Day. X 10 days, Disp: 20 tablet, Rfl: 0    metroNIDAZOLE (Flagyl) 500 MG tablet, Take 1 tablet by mouth 2 (Two) Times a Day for 10 days., Disp: 20 tablet, Rfl: 0    Wegovy 2.4 MG/0.75ML solution auto-injector, Inject 2.4 mg under the skin into the appropriate area as directed Every 7 (Seven) Days., Disp: , Rfl:      Allergies:  has No Known Allergies.    Social History:   Social History     Socioeconomic History    Marital status:    Tobacco Use    Smoking status: Never     Passive exposure: Never    Smokeless tobacco: Never   Vaping Use    Vaping status: Never Used   Substance and Sexual Activity    Alcohol use: Not Currently     Alcohol/week: 1.0 standard drink of alcohol     Types: 1 Glasses of wine per week    Drug use: Never    Sexual activity: Yes     Partners: Male     Birth control/protection: Tubal ligation       Family History:    Family History   Problem Relation Age of Onset     "Stroke Mother     Diabetes Paternal Grandmother     Breast cancer Maternal Aunt        Health Maintenance:    Health Maintenance   Topic Date Due    COLORECTAL CANCER SCREENING  Never done    INFLUENZA VACCINE  Never done    HEPATITIS C SCREENING  Never done    ANNUAL PHYSICAL  Never done    TDAP/TD VACCINES (2 - Td or Tdap) 08/09/2023    COVID-19 Vaccine (1 - 2023-24 season) Never done    MAMMOGRAM  09/16/2023    BMI FOLLOWUP  04/19/2024    PAP SMEAR  08/03/2026    Pneumococcal Vaccine 0-64  Aged Out       Review of Systems:  Please refer to history of present illness.  Review of systems otherwise negative.    Physical Exam:  Vitals:    03/15/24 1435   BP: 122/61   Pulse: 80   Resp: 17   Temp: 97.5 °F (36.4 °C)   TempSrc: Temporal   SpO2: 98%   Weight: 70.8 kg (156 lb 1.6 oz)   Height: 162.6 cm (64.02\")   PainSc: 0-No pain       Body mass index is 26.78 kg/m².  Wt Readings from Last 3 Encounters:   03/15/24 70.8 kg (156 lb 1.6 oz)   02/23/24 69.4 kg (153 lb)   02/15/24 68 kg (150 lb)     GENERAL: Alert, well-appearing female appearing her stated age who is in no apparent distress.   HEENT: Sclera anicteric. Head normocephalic, atraumatic. Mucus membranes moist.   NECK: Trachea midline, supple, without masses.  No thyromegaly.   BREASTS: Deferred  CARDIOVASCULAR: No peripheral edema.  RESPIRATORY: normal respiratory effort  GASTROINTESTINAL:  Abdomen is soft, non-tender, non-distended, no rebound or guarding, no masses, or hernias. No HSM.   SKIN:  Warm, dry, well-perfused.  All visible areas intact.  No rashes, lesions, ulcers.  PSYCHIATRIC: AO x3, with appropriate affect, normal thought processes.  NEUROLOGIC: No focal deficits.  Moves extremities well.  MUSCULOSKELETAL: Normal gait and station.   EXTREMITIES:   No cyanosis, clubbing, symmetric.     PELVIC exam: External genitalia is normal including normal vulva vagina and urethra.  Speculum examination reveals copious yellowish discharge within the vagina.  This " "is cleared away after 5 large swabs.  No odor appreciated.  Cervix is evaluated.  There is evidence of good granulation tissue along the surgical bed.  Cervix otherwise healing well.  Posterior lip of the cervix is flush with the posterior vagina.  Bimanual examination reveals minimal cervical motion tenderness.  Cervix otherwise well-healed.    ECOG PS 0    PROCEDURES:  none    Diagnostic Data:    No Images in the past 120 days found..    Lab Results   Component Value Date    WBC 6.2 10/13/2020    HGB 12.2 10/13/2020    HCT 36.9 10/13/2020    MCV 96.9 10/13/2020     10/13/2020    NEUTROABS 2.9 10/13/2020    BUN 11 10/13/2020    CREATININE 0.92 10/13/2020    EGFRIFNONA 75 10/13/2020    EGFRIFAFRI 86 10/13/2020     10/13/2020    K 4.1 10/13/2020     10/13/2020    CO2 28 10/13/2020    CALCIUM 9.4 10/13/2020    ALBUMIN 4.3 10/13/2020    AST 15 10/13/2020    ALT 10 10/13/2020    BILITOT 0.2 10/13/2020   PATHOLOGY:  DIAGNOSIS:  A.   CERVIX, CONIZATION, LEEP, TOP HALF:  Invasive moderately differentiated squamous cell carcinoma in a  background of extensive high-grade squamous intraepithelial lesion  extending into endocervical glands  Invasive carcinoma measures 2 mm wide with a depth of invasion of 1 mm  All margins free of invasive carcinoma  HSIL present at endocervical and deep margins via endocervical  glandular extension  B.   CERVIX, CONIZATION, LEEP, TOP HAT:  Cervical transformation zone with high-grade squamous intraepithelial  lesion, extending into endocervical glands  Negative for invasive carcinoma  HSIL present at ectocervical mucosal margin and deep margin via  endocervical glandular extension  C.   CERVIX, CONIZATION, LEEP, BOTTOM HAT:  Cervical transformation zone with high-grade squamous intraepithelial lesion  Negative for invasive carcinoma  All margins benign  PDL CPS 90  No results found for: \"\"      Assessment & Plan   This is a 48 y.o. 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 She had lower abdominal pain on thursday morning which she initially thought that is from holding urine for long time or form constipation but it persisted through out day so she went to PCP next day who gave her cipro and fleet enema for constipation. Her pain was dull over right lower quadrant constant, 5/10 in intensity. After enema she had small bowel movement but it did not help her with pain and she also noted some nausea. Denies urinary problems, chest pain, dizziness, vomiting, SOB, BE.       HISTORY OF PRESENT ILLNESS: As above. Awake, alert, comfortable in bed in NAD. Has been doing well on albuterol HFA PRN and symbicort inhaler whenever she gets sob, cough or wheezing. Denies smoking hx.    MEDICATIONS  (STANDING):  buDESOnide  80 MICROgram(s)/formoterol 4.5 MICROgram(s) Inhaler 2 Puff(s) Inhalation two times a day  cefTRIAXone   IVPB 1 Gram(s) IV Intermittent every 24 hours  enoxaparin Injectable 40 milliGRAM(s) SubCutaneous daily  metroNIDAZOLE  IVPB 500 milliGRAM(s) IV Intermittent every 8 hours  pantoprazole    Tablet 40 milliGRAM(s) Oral before breakfast  sodium chloride 0.9%. 1000 milliLiter(s) (125 mL/Hr) IV Continuous <Continuous>  tamsulosin 0.4 milliGRAM(s) Oral at bedtime      MEDICATIONS  (PRN):      Allergies    Motrin (Swelling)    Intolerances        PAST MEDICAL & SURGICAL HISTORY:  COPD (chronic obstructive pulmonary disease)  S/P thyroid surgery  S/P RANDOLPH-BSO      FAMILY HISTORY:  No pertinent family history in first degree relatives      SOCIAL HISTORY  Smoking History:     REVIEW OF SYSTEMS:    CONSTITUTIONAL:  No fevers, chills, sweats    HEENT:  Eyes:  No diplopia or blurred vision. ENT:  No earache, sore throat or runny nose.    CARDIOVASCULAR:  No pressure, squeezing, tightness, or heaviness about the chest; no palpitations.    RESPIRATORY:  Per HPI    GASTROINTESTINAL:  + abdominal pain but no nausea, vomiting or diarrhea.    GENITOURINARY:  No dysuria, frequency or urgency.    NEUROLOGIC:  No paresthesias, fasciculations, seizures or weakness.    PSYCHIATRIC:  No disorder of thought or mood.    Vital Signs Last 24 Hrs  T(C): 37.6 (10 Sep 2018 07:47), Max: 37.6 (10 Sep 2018 07:47)  T(F): 99.6 (10 Sep 2018 07:47), Max: 99.6 (10 Sep 2018 07:47)  HR: 75 (10 Sep 2018 07:47) (75 - 88)  BP: 125/65 (10 Sep 2018 07:47) (125/62 - 129/63)  BP(mean): --  RR: 16 (10 Sep 2018 07:47) (16 - 18)  SpO2: 100% (10 Sep 2018 07:47) (97% - 100%)  I&O's Detail      PHYSICAL EXAMINATION:    GENERAL: The patient is a well-developed, well-nourished _____in no apparent distress.     HEENT: Head is normocephalic and atraumatic. Extraocular muscles are intact. Mucous membranes are moist.     NECK: Supple.     LUNGS: Clear to auscultation without wheezing, rales, or rhonchi. Respirations unlabored    HEART: Regular rate and rhythm without murmur.    ABDOMEN: Soft, mildly tender but nondistended.  No hepatosplenomegaly is noted.    EXTREMITIES: Without any cyanosis, clubbing, rash, lesions or edema.    NEUROLOGIC: Grossly intact.      LABS:                        10.9   15.1  )-----------( 185      ( 09 Sep 2018 19:14 )             33.0         133<L>  |  99  |  32<H>  ----------------------------<  106<H>  3.6   |  19<L>  |  1.09    Ca    8.3<L>      09 Sep 2018 19:14  Mg     1.8         TPro  7.4  /  Alb  2.5<L>  /  TBili  0.6  /  DBili  x   /  AST  17  /  ALT  25  /  AlkPhos  89  -    PT/INR - ( 09 Sep 2018 19:14 )   PT: 10.3 sec;   INR: 0.95 ratio         PTT - ( 09 Sep 2018 19:14 )  PTT:27.4 sec  Urinalysis Basic - ( 09 Sep 2018 19:14 )    Color: Yellow / Appearance: Slightly Turbid / S.020 / pH: x  Gluc: x / Ketone: Large  / Bili: Negative / Urobili: Negative   Blood: x / Protein: 100 / Nitrite: Negative   Leuk Esterase: Moderate / RBC: 2-5 /HPF / WBC 11-25 /HPF   Sq Epi: x / Non Sq Epi: Few /HPF / Bacteria: Moderate /HPF        CARDIAC MARKERS ( 09 Sep 2018 19:14 )  <0.015 ng/mL / x     / 47 U/L / x     / <1.0 ng/mL          Lactate, Blood: 0.8 mmol/L (09-10-18 @ 01:14)        MICROBIOLOGY:    RADIOLOGY & ADDITIONAL STUDIES:  < from: CT Abdomen and Pelvis w/ Oral Cont and w/ IV Cont (18 @ 22:18) >  IMPRESSION:    Left-sided pyelonephritis with small microabscesses. Mild left renal   pelvic fullness without hydroureter. A 2 mm stone in the course of the   left distal ureter may represent an obstructing stone as opposed to   phlebolith; difficult to assess without ureteral distention or previous   study for comparison. Duodenitis.  Mild colitis.    < end of copied text >    CXR:  < from: Xray Chest 1 View AP/PA (18 @ 20:00) >  Impression: Subsegmental atelectasis left lower lobe. Mild relative   elevation of left diaphragm      < end of copied text >    Ct scan chest:    ekg;    echo: cervical cancer status post LEEP procedure on February 15 presenting for pelvic exam prior to undergoing robotic assisted laparoscopic hysterectomy, bilateral salpingectomy on March 28.    Encounter Diagnoses   Name Primary?    Malignant neoplasm of cervix, unspecified site Yes    Cervicitis        Postoperative exam.  Patient had copious amount of yellowish discharge from the cervix.  Cervical otherwise is healing well. Patient with mild cervical motion tenderness on exam and complaints of increased pelvic cramping. Given the copious yellowish discharge with plans for upcoming hysterectomy, will treat with a course of doxycycline and Flagyl x 10 days for potential cervicitis.      2.  Stage I A1 cervical carcinoma.  Plan for robotic assisted total laparoscopic hysterectomy, bilateral salpingectomy with ovarian retention.  Currently scheduled for March 28 with Dr. Dumont.  Reviewed the surgical procedure with patient again.  Answered several questions.          Pain assessment was performed today as a part of patient’s care.  For patients with pain related to surgery, gynecologic malignancy or cancer treatment, the plan is as noted in the assessment/plan.  For patients with pain not related to these issues, they are to seek any further needed care from a more appropriate provider, such as PCP.      No orders of the defined types were placed in this encounter.    FOLLOW UP: Surgery    I spent 30 minutes caring for Delia on this date of service. This time includes time spent by me in the following activities: preparing for the visit, reviewing tests, performing a medically appropriate examination and/or evaluation, counseling and educating the patient/family/caregiver, ordering medications, tests, or procedures, referring and communicating with other health care professionals, documenting information in the medical record, and care coordination      Rissa Berry MD  03/15/24  16:49 EDT

## 2024-03-13 NOTE — H&P (VIEW-ONLY)
Delia Ann  [id number]  1975      Reason for visit:  Newly diagnosed cervical cancer     Consultation:  Patient is being seen at the request of Dr. White.     History of present illness:  Delia Ann  is a 48 y.o. year old female who presents today for evaluation.  Patient was last seen in the office by Dr. Rowe on 2024 for newly diagnosed stage Ia 1 cervical carcinoma diagnosed on a LEEP specimen.  LEEP was performed on February 15.  She presents today for a pre-op pelvic exam given that exam was not performed due to recent LEEP procedure.  Patient is currently scheduled for surgery on .    She had a normal pap test in August, has always had normal pap tests. In October, she had an endometrial ablation for irregular bleeding. However, pathology from that procedure demonstrated some abnormal cells. She had a LEEP performed 2/15 (delayed by illness). Pathology from this demonstrated invasive moderately differentiated squamous cell carcinoma. She has not had any bleeding since her ablation. She was taking Wegovy, last took a shot .  Patient was counseled to proceed forward with robotic assisted total laparoscopic hysterectomy, bilateral salpingectomy with ovarian retention.  Surgery is scheduled for 3/28/2024.      Patient started having some yellowish vaginal discharge over last 2 wks; occasional brownish.  No odor.   Noticing some pelvic cramping over last several days as well.  Not needing any pain medications.  No fevers/chills.  Normal bowel and bladder function.   No vaginal bleeding.       She has a negative medical history. She reports no known history of cancer other than a maternal aunt diagnosed with breast cancer >60 years of age. She does not smoke. She has not had a colonoscopy. Last had a mammogram 2022.     For new patients, [facility] intake form from today was reviewed and confirmed.    OBGYN History:  She is a .  She does not use HRT. She does not have a  history of abnormal pap smears.    Oncologic History:  Oncology/Hematology History   Cervical cancer   2/15/2024 Cancer Staged    Staging form: ONC CERVIX UTERI AJCC V9  - Clinical stage from 2/15/2024: FIGO Stage IA1 (cT1a1, cN0, cM0) - Signed by Alexia Rowe MD on 2/23/2024  Staging comments: Diagnosed on LEEP  2 mm with a 1 mm depth of invasion  PD-L1 CPS 90     2/23/2024 Initial Diagnosis    Cervical cancer           Past Medical History:   Diagnosis Date    Cervical cancer 2/23/2024    Preeclampsia 11/1995       Past Surgical History:   Procedure Laterality Date    GALLBLADDER SURGERY  07/1998    TUBAL ABDOMINAL LIGATION  07/2011    WISDOM TOOTH EXTRACTION  ??       MEDICATIONS:    Current Outpatient Medications:     hydroCHLOROthiazide (MICROZIDE) 12.5 MG capsule, Take 1 capsule by mouth Every Morning., Disp: , Rfl:     Multiple Vitamins-Minerals (multivitamin and minerals) liquid liquid, Take  by mouth Daily., Disp: , Rfl:     doxycycline (VIBRAMYICN) 100 MG tablet, Take 1 tablet by mouth 2 (Two) Times a Day. X 10 days, Disp: 20 tablet, Rfl: 0    metroNIDAZOLE (Flagyl) 500 MG tablet, Take 1 tablet by mouth 2 (Two) Times a Day for 10 days., Disp: 20 tablet, Rfl: 0    Wegovy 2.4 MG/0.75ML solution auto-injector, Inject 2.4 mg under the skin into the appropriate area as directed Every 7 (Seven) Days., Disp: , Rfl:      Allergies:  has No Known Allergies.    Social History:   Social History     Socioeconomic History    Marital status:    Tobacco Use    Smoking status: Never     Passive exposure: Never    Smokeless tobacco: Never   Vaping Use    Vaping status: Never Used   Substance and Sexual Activity    Alcohol use: Not Currently     Alcohol/week: 1.0 standard drink of alcohol     Types: 1 Glasses of wine per week    Drug use: Never    Sexual activity: Yes     Partners: Male     Birth control/protection: Tubal ligation       Family History:    Family History   Problem Relation Age of Onset     "Stroke Mother     Diabetes Paternal Grandmother     Breast cancer Maternal Aunt        Health Maintenance:    Health Maintenance   Topic Date Due    COLORECTAL CANCER SCREENING  Never done    INFLUENZA VACCINE  Never done    HEPATITIS C SCREENING  Never done    ANNUAL PHYSICAL  Never done    TDAP/TD VACCINES (2 - Td or Tdap) 08/09/2023    COVID-19 Vaccine (1 - 2023-24 season) Never done    MAMMOGRAM  09/16/2023    BMI FOLLOWUP  04/19/2024    PAP SMEAR  08/03/2026    Pneumococcal Vaccine 0-64  Aged Out       Review of Systems:  Please refer to history of present illness.  Review of systems otherwise negative.    Physical Exam:  Vitals:    03/15/24 1435   BP: 122/61   Pulse: 80   Resp: 17   Temp: 97.5 °F (36.4 °C)   TempSrc: Temporal   SpO2: 98%   Weight: 70.8 kg (156 lb 1.6 oz)   Height: 162.6 cm (64.02\")   PainSc: 0-No pain       Body mass index is 26.78 kg/m².  Wt Readings from Last 3 Encounters:   03/15/24 70.8 kg (156 lb 1.6 oz)   02/23/24 69.4 kg (153 lb)   02/15/24 68 kg (150 lb)     GENERAL: Alert, well-appearing female appearing her stated age who is in no apparent distress.   HEENT: Sclera anicteric. Head normocephalic, atraumatic. Mucus membranes moist.   NECK: Trachea midline, supple, without masses.  No thyromegaly.   BREASTS: Deferred  CARDIOVASCULAR: No peripheral edema.  RESPIRATORY: normal respiratory effort  GASTROINTESTINAL:  Abdomen is soft, non-tender, non-distended, no rebound or guarding, no masses, or hernias. No HSM.   SKIN:  Warm, dry, well-perfused.  All visible areas intact.  No rashes, lesions, ulcers.  PSYCHIATRIC: AO x3, with appropriate affect, normal thought processes.  NEUROLOGIC: No focal deficits.  Moves extremities well.  MUSCULOSKELETAL: Normal gait and station.   EXTREMITIES:   No cyanosis, clubbing, symmetric.     PELVIC exam: External genitalia is normal including normal vulva vagina and urethra.  Speculum examination reveals copious yellowish discharge within the vagina.  This " "is cleared away after 5 large swabs.  No odor appreciated.  Cervix is evaluated.  There is evidence of good granulation tissue along the surgical bed.  Cervix otherwise healing well.  Posterior lip of the cervix is flush with the posterior vagina.  Bimanual examination reveals minimal cervical motion tenderness.  Cervix otherwise well-healed.    ECOG PS 0    PROCEDURES:  none    Diagnostic Data:    No Images in the past 120 days found..    Lab Results   Component Value Date    WBC 6.2 10/13/2020    HGB 12.2 10/13/2020    HCT 36.9 10/13/2020    MCV 96.9 10/13/2020     10/13/2020    NEUTROABS 2.9 10/13/2020    BUN 11 10/13/2020    CREATININE 0.92 10/13/2020    EGFRIFNONA 75 10/13/2020    EGFRIFAFRI 86 10/13/2020     10/13/2020    K 4.1 10/13/2020     10/13/2020    CO2 28 10/13/2020    CALCIUM 9.4 10/13/2020    ALBUMIN 4.3 10/13/2020    AST 15 10/13/2020    ALT 10 10/13/2020    BILITOT 0.2 10/13/2020   PATHOLOGY:  DIAGNOSIS:  A.   CERVIX, CONIZATION, LEEP, TOP HALF:  Invasive moderately differentiated squamous cell carcinoma in a  background of extensive high-grade squamous intraepithelial lesion  extending into endocervical glands  Invasive carcinoma measures 2 mm wide with a depth of invasion of 1 mm  All margins free of invasive carcinoma  HSIL present at endocervical and deep margins via endocervical  glandular extension  B.   CERVIX, CONIZATION, LEEP, TOP HAT:  Cervical transformation zone with high-grade squamous intraepithelial  lesion, extending into endocervical glands  Negative for invasive carcinoma  HSIL present at ectocervical mucosal margin and deep margin via  endocervical glandular extension  C.   CERVIX, CONIZATION, LEEP, BOTTOM HAT:  Cervical transformation zone with high-grade squamous intraepithelial lesion  Negative for invasive carcinoma  All margins benign  PDL CPS 90  No results found for: \"\"      Assessment & Plan   This is a 48 y.o. woman with newly diagnosed 1A1 " cervical cancer status post LEEP procedure on February 15 presenting for pelvic exam prior to undergoing robotic assisted laparoscopic hysterectomy, bilateral salpingectomy on March 28.    Encounter Diagnoses   Name Primary?    Malignant neoplasm of cervix, unspecified site Yes    Cervicitis        Postoperative exam.  Patient had copious amount of yellowish discharge from the cervix.  Cervical otherwise is healing well. Patient with mild cervical motion tenderness on exam and complaints of increased pelvic cramping. Given the copious yellowish discharge with plans for upcoming hysterectomy, will treat with a course of doxycycline and Flagyl x 10 days for potential cervicitis.      2.  Stage I A1 cervical carcinoma.  Plan for robotic assisted total laparoscopic hysterectomy, bilateral salpingectomy with ovarian retention.  Currently scheduled for March 28 with Dr. Dumont.  Reviewed the surgical procedure with patient again.  Answered several questions.          Pain assessment was performed today as a part of patient’s care.  For patients with pain related to surgery, gynecologic malignancy or cancer treatment, the plan is as noted in the assessment/plan.  For patients with pain not related to these issues, they are to seek any further needed care from a more appropriate provider, such as PCP.      No orders of the defined types were placed in this encounter.    FOLLOW UP: Surgery    I spent 30 minutes caring for Edlia on this date of service. This time includes time spent by me in the following activities: preparing for the visit, reviewing tests, performing a medically appropriate examination and/or evaluation, counseling and educating the patient/family/caregiver, ordering medications, tests, or procedures, referring and communicating with other health care professionals, documenting information in the medical record, and care coordination      Rissa Berry MD  03/15/24  16:49 EDT

## 2024-03-15 ENCOUNTER — OFFICE VISIT (OUTPATIENT)
Dept: GYNECOLOGIC ONCOLOGY | Facility: CLINIC | Age: 49
End: 2024-03-15
Payer: COMMERCIAL

## 2024-03-15 VITALS
SYSTOLIC BLOOD PRESSURE: 122 MMHG | RESPIRATION RATE: 17 BRPM | DIASTOLIC BLOOD PRESSURE: 61 MMHG | OXYGEN SATURATION: 98 % | TEMPERATURE: 97.5 F | WEIGHT: 156.1 LBS | HEART RATE: 80 BPM | HEIGHT: 64 IN | BODY MASS INDEX: 26.65 KG/M2

## 2024-03-15 DIAGNOSIS — N72 CERVICITIS: ICD-10-CM

## 2024-03-15 DIAGNOSIS — C53.9 MALIGNANT NEOPLASM OF CERVIX, UNSPECIFIED SITE: Primary | ICD-10-CM

## 2024-03-15 RX ORDER — METRONIDAZOLE 500 MG/1
500 TABLET ORAL 2 TIMES DAILY
Qty: 20 TABLET | Refills: 0 | Status: SHIPPED | OUTPATIENT
Start: 2024-03-15 | End: 2024-03-25

## 2024-03-15 RX ORDER — DOXYCYCLINE HYCLATE 100 MG
100 TABLET ORAL 2 TIMES DAILY
Qty: 20 TABLET | Refills: 0 | Status: SHIPPED | OUTPATIENT
Start: 2024-03-15

## 2024-03-15 NOTE — PATIENT INSTRUCTIONS
Surgery Instructions            Delia Ann  [id number]  1975      SURGEON:  Alexia Rowe MD    Surgery Coordinator: Yulia PHELPS    Gynecological Oncology  [address]   [facility], [address]  Phone: [phone]                   Fax: [phone]      Surgery Appointment      Your surgery has been scheduled on 03/28/2024.  You will need to go to Main Registration to check in at 1100. Then you will be sent to the [address] [place] [address] floor surgery registration desk to check in.    Nothing by mouth after midnight on 03/27/2024.    If you are feeling sick, have a fever or cough and have seen your PCP let our office know 48 hours prior to surgery. It may be subject to rescheduling.       The Day of Surgery:    Do not chew gum or tobacco, smoke, or eat mints or hard candy. Shower and wash your hair. You may brush your teeth but do not swallow water. Use any wipes that Pre-admission testing has given you.     Please arrive for surgery as instructed by the pre-op nurse, often one to two hours before your surgery.  Once you are called to go to your pre-op room, no one will be allowed in the pre op room.   Please note no one under age 12 is permitted to stay in the waiting area without supervision.  Remove all jewelry, including rings and piercings. Do not bring valuables to the hospital.  Wear loose-fitting clothing.  Avoid wearing eye makeup or contact lenses  We make every effort to begin surgery at your scheduled start time but delays do occur. We will keep you and your family updated about any delays  Please note: you MUST have a  over the age of 18 to drive you home from the hospital. You may not use Uber, Lyft or a taxi.    Please remember to bring:    Photo ID and current medical insurance card  Advanced directives, living will or power of  (if applicable)  Current list of all medications, including over-the- counter and herbal  supplements  List of allergies  CPAP device if you have sleep apnea  Any assistive devices or equipment needed after surgery    While You are In the Pre-Op Room:  The nurse will review your health history and will place an IV (into the vein) in your hand or arm for fluids and medicines.  An anesthesia provider will talk with you about anesthesia and pain control during and after surgery.  A member of the surgical team can answer your questions.    Directions to [facility]  [address] [place] [address] ? [place], [place] [address] ? [phone]    From [address] and [address] [facility]:  Take [address] to the [employer] exit. Go right on [address] to [email or web address]. Right on [address] to [facility].   Left on [address] to [facility] which is on the left.    From [address] [facility]:  Get off [address] at the [employer] exit. Go left on [address] to [email or web address]. Right on [email or web address] to [facility]. Left on   [address] to [facility] which is on the left.     From the South ([address]):  Follow [address] to approximately one mile inside [facility]. [facility] is on the right at [facility] and   [facility].     Parking:  Free  Parking - Take Entrance 2 off of [address] and go straight ahead to [address] [place] [address].  Self Parking - Take Entrance 1 off of [address], bear left and follow the road to [facility].    Directions to Registration:  If entering through front of [address] [place] [address] ( parking), take a right and proceed up the hallway connecting [address] [place] [address] to   [address] [place] [address]. Registration is on the left about intermediate up the perry.    If entering from [facility], take garage elevator to first floor (1), exit to the right and proceed through the doors to outside, follow the covered sidewalk to entrance of [facility], follow signs to [address] [place] [address], this leads to the [facility] lobby and information desk.  "Proceed past the information desk to the hallway that connects [address] [place] [address] to the [facility] [place]. Registration is on the left about [facility] up the [address].    Directions to Pre-admission Testing:  Follow directions to Registration and Pre-admission Testing is next door to Registration             PREPARING FOR SURGERY  **Disability or Work Release Forms     Work: The amount of time you will be off work after surgery depends on both your surgery and your job. Discuss this with your doctor before surgery. If you have any questions about this, call your doctor.  You must provide all forms completed and signed to the GYN ONCOLOGY office.    FORM FOR AUHTHORIZATION FOR USE AND/OR DISCLOSURE OF PROCTED HEALTH INFORMATION CAN BE PROVIDED UPON REQUEST.    Preoperative Evaluation and Optimization  If your doctor tells you to get a preoperative evaluation from your primary care provider, cardiologist, or other specialist, it is your responsibility to make sure to complete these well before your surgery. We want you to get evaluated to make sure you are as healthy as possible when you have your surgery. If the evaluation, including all recommended testing, is not done in time, your surgery will be postponed.    If you take diabetic medications please consult with the prescriber.  Continue antidepressants, Beta Blockers \"olol\", anti-seizure medication, GERD medication (heartburn), Opioids and Parkinson's medication.  Let us know if you have a history of blood clots or are taking a blood thinner before your surgery, this will need to be held and you will need to discuss this with staff.   If you are taking any weight loss medications please let our staff now. Ideally they will need to be held 2 weeks prior to your procedure.  You are allowed 1 visitor that may remain in the waiting room at both locations.  Visitors cannot come back to pre-op or post-op areas.    Please note: you MUST have a  over the age of 18 to drive you " home from the hospital. You may not use Uber, Lyft or a taxi.    Physical Fitness  Research shows that getting more physical activity before surgery can lower your risk for problems after surgery. Walking is a great way to improve your fitness level before surgery. Even if you start walking just a few weeks before surgery, it can make a big difference.     Quit Smoking  If you smoke, your risk of having a lung problem is at least twice that of a non-smoker.    Surgical incisions will not heal as well and you have a higher risk of infection  The heart has to work harder.  It is best to quit smoking 6 to 8 weeks before surgery. This gives your lungs more time to recover.    Outpatient Surgery  You will need to have someone bring you to the hospital, stay in the waiting room during your procedure and take you home at discharge. It is recommended that someone stay with you 24 hours after your procedure.     If you live more than a 4-hour drive away from the hospital, or live in an area without easy access to an emergency department, we recommend you plan to spend another night or two close to the hospital before you go home. For assistance with hotel, prices and vouchers let our office know and we can let you talk with our Oncology Social worker, Mi Montes De Oca.     Post-Operative Visit  You will be scheduled a post-operative appointment for 3 weeks after your surgical procedure. If you do not have an appointment please call the office and have that scheduled.     How to prevent nausea  The best way to prevent nausea is to eat frequent small meals. It is especially important to eat something before taking pain medication. Take your ondansetron if you are feeling nauseous do not wait.    Pain Management after Surgery    If you have kidney disease or liver disease and are not to take ibuprofen or Tylenol please let your doctor or nurse know.     Driving: Do not drive while you are taking prescription pain medications.      It is normal to have some pain after surgery. The goal of managing your acute pain after surgery is to minimize your pain so you feel comfortable enough to get up, take deep breaths, wash, get dressed, and do simple tasks in your home. Some discomfort is likely. We do not expect you to be completely free of pain.   Pain is usually worst the first 24-48 hours after surgery.    What can I do to relieve pain without medications?   Apply heat with a warm compress, hot water bottle, or heating pad. Do not put anything hot directly on your skin or lie on top of it.   Apply cooling with a cold gel pack, bag of peas, or crushed ice. Wrap in a soft cloth or towel.   Do not push or press on your incision. It is normal for your incision to be sore for up to 6 weeks if you push on it.   Unless your doctor gives you a different plan, ibuprofen and acetaminophen are the main medicines you will use to manage your pain.   You may also get a prescription for an opioid such as oxycodone or hydrocodone. Opioids should only be added as needed to reduce pain that is not adequately relieved by ibuprofen and acetaminophen.                                                                                         Typical Pain Medication Schedule  6 am Ibuprofen 600 mg   9 am acetaminophen 650 mg   12:00 pm Noon Ibuprofen 600 mg   3:00 pm Acetaminophen 650 mg   6:00 pm Ibuprofen 600 mg   9:00 pm Acetaminophen 650 mg   12:00 am Midnight  Ibuprofen 600 mg.      What if this schedule does not control my pain?   Please call the office and let us know at [phone]  Reduce the number and frequency of opioids as soon as you can. Do not take more opioid medication than your doctor has prescribed.   Common side effects and risks of opioids include drowsiness, mental confusion, dizziness, nausea, constipation, itching, dry mouth, and slowed breathing.   Never mix opioids with alcohol, sleep aids or anti-anxiety medications. These are dangerous  combinations that increase the harmful effects of opioid pain medication. Many overdose deaths from opioids also involve at least one other drug or alcohol.   It is illegal to sell or share an opioid without a prescription properly issued by a licensed health care prescriber.    What is the best way to stop taking pain medications?  1. Stop opioid use.  2. Stop acetaminophen.  3. Gradually decrease how often you take ibuprofen. It is a good idea to take a 600 mg pill before you start a more tiring activity such as going shopping or for a long walk.  Once you get more active, you may have a day when your pain gets a little worse. If this happens, take ibuprofen. If ibuprofen does not relieve the pain, add acetaminophen.    What do I need to know about bowel movements?   Starting as soon as you get home, take 17 grams of Miralax (one capful) twice a day to keep your stool soft and prevent constipation. It is important to prevent constipation because straining can damage your stitches. Your stool should be as soft as toothpaste. If your stool gets too loose, cut back to using Miralax only once a day.   If you used a bowel prep before surgery, it is common not to have a bowel movement on the first and second day after surgery.   If you have not had a bowel movement by 7 p.m. on the third day after surgery, do one of the following at bedtime:  Drink 1 ounce (2 tablespoons) of Milk of Magnesia (MOM). If you have used MOM before and know you need to take 2 ounces for it to work for you, it is OK to do this, or Take 2 Senekot tablets.   Go for short walks. Walking and being active will help you have a bowel movement.   If you have not had a bowel movement by noon on the fourth day after surgery, call the clinic where you were seen and ask to speak with a nurse.    What kind of vaginal bleeding is normal?  Spotting of pink or red blood from the vagina is normal. Brown-colored discharge that gradually changes to a light  yellow or cream color is also normal and can last up to 6 weeks. The brownish discharge is old blood and often has a strong odor, this is okay. Call us if it becomes heavier or foul smelling or you are saturating a maxi pad within an hour.     At Home after Surgery: If you experience a medical emergency call 911 or have someone drive you to your nearest emergency department.     When should I call my doctor?  Call your doctor right away, any time of the day or night, including on weekends and holidays, if you have any of the following signs or symptoms:   A temperature over 100.4°F (38°C) If you don't have one, please buy a thermometer before your surgery.   Heavy bleeding (soaking a regular pad in an hour or less)   Severe pain in your abdomen or pelvis that the pain medication is not helping   Chest pain or difficulty breathing   Swelling, redness, or pain in your legs   An incision that opens   An incision that is red or hot   Fluid or blood leaking from an incision   New bruising after leaving the hospital that is large or spreading. A little bit of bruising around an incision is normal.   Nausea and vomiting    Skin rash   Unable to urinate at all   Pain or stinging when you pass urine   Blood or cloudiness in your urine   Non-stop urge to pass urine, but only dribbling when you try to go   A sense that something is wrong.    Caring for post-surgical incisions     Do not have vaginal intercourse until your doctor evaluates you at a postop visit and tells you OK.     Showers: You may shower starting 24 hours after your surgery.    NO BATHS: do not take a tub bath up to 6 weeks after surgery.   Do not put any lotion, oil, gel, or powder on or near your incisions.     For incisions inside your vagina: Incisions inside the vagina are closed with dissolvable stitches. When they dissolve you may see little bits of suture material that look like thin pieces of string on your underwear or on toilet tissue after wiping.  This is normal. Do not put anything inside the vagina until your doctor evaluates you at a postop visit and tells you when it will be OK.      For incisions on your skin: If there is a dressing over the incision, remove it before your first shower. Leave the slim adhesive strips that are under the dressing in place. During the week after surgery, they will usually curl up at the edges and then come off on their own. If they are still there a week after surgery, gently remove them.  To clean the incisions, first wash your hands, and then get your hands sudsy with soap and gently wash or let the sudsy water run down over the incisions. Dry the incisions well after washing by gently patting with a towel. You may use a blow dryer, but it must be on a low-heat setting.    When will my bladder function get back to normal?   You received extra fluid through your I.V. while you were in the hospital, so it is normal to urinate (pee) more than usual when you first get home.   It is normal for your bladder function to be different after surgery. You may notice a pause before your urine stream starts or that your urine stream is slower. This will gradually get better, but it may take up to 6 months before you are back to normal. Be patient, relax, and sit on the toilet a little longer.   Drinking more water than usual will not help the bladder recover faster.    What is a normal energy level?  It is normal to have a decreased energy level after surgery. Listen to your body. If you need to rest, do it. Give yourself permission to take it easy. Once you settle into a normal routine at home, you will find that you slowly begin to feel better. Walking around the house and taking short walks outside will help you get back to normal.    What kind of exercise/activities can I do?   Exercise is important for a healthy recovery. We encourage you to begin normal physical activity, like walking, within hours of surgery. Start with short  walks and gradually increase the distance and length of time that you walk.   Allow your body time to heal. Do not restart a difficult exercise routine until you have had your post-op exam and your doctor says it is OK.   Lifting: Unless you are given other instructions, for 6 weeks after your surgery do not lift anything over 15 pounds.   Travel: It is best if you do not go far away from home before your postop visit with your doctor. If you have travel plans, talk to your doctor about this before your surgery.      Financial Assistance:    If you have any questions or need assistance, contact your [facility] financial counseling office from 8:30 a.m.-4:30  p.m. Monday through Friday. Closed weekends.   [place]: [phone] or, or visit at [address] [facility], near the entrance.  Financial Assistance Application available upon request      Patient Payments and Correspondence  Customer service representatives are available to assist you from 8:00 a.m. to 6:00 p.m. Eastern Standard Time by calling [phone] Monday through Friday. You can also contact us through [employer].    [facility]  [address]  [place] [address] [phone]

## 2024-03-27 ENCOUNTER — TELEPHONE (OUTPATIENT)
Dept: GYNECOLOGIC ONCOLOGY | Facility: CLINIC | Age: 49
End: 2024-03-27
Payer: COMMERCIAL

## 2024-03-27 ENCOUNTER — ANESTHESIA EVENT (OUTPATIENT)
Dept: PERIOP | Facility: HOSPITAL | Age: 49
End: 2024-03-27
Payer: COMMERCIAL

## 2024-03-27 RX ORDER — SODIUM CHLORIDE 0.9 % (FLUSH) 0.9 %
10 SYRINGE (ML) INJECTION EVERY 12 HOURS SCHEDULED
Status: CANCELLED | OUTPATIENT
Start: 2024-03-27

## 2024-03-27 RX ORDER — SODIUM CHLORIDE 0.9 % (FLUSH) 0.9 %
10 SYRINGE (ML) INJECTION AS NEEDED
Status: CANCELLED | OUTPATIENT
Start: 2024-03-27

## 2024-03-27 RX ORDER — FAMOTIDINE 10 MG/ML
20 INJECTION, SOLUTION INTRAVENOUS ONCE
Status: CANCELLED | OUTPATIENT
Start: 2024-03-27 | End: 2024-03-27

## 2024-03-27 RX ORDER — SODIUM CHLORIDE 9 MG/ML
40 INJECTION, SOLUTION INTRAVENOUS AS NEEDED
Status: CANCELLED | OUTPATIENT
Start: 2024-03-27

## 2024-03-27 NOTE — TELEPHONE ENCOUNTER
CONFIRM SURGERY ON 03/28. PLEASE ARRIVE AT 11 OR 11:15 AM . AT [facility].     NPO AFTER MIDNIGHT ON 03/27/2024

## 2024-03-28 ENCOUNTER — ANESTHESIA (OUTPATIENT)
Dept: PERIOP | Facility: HOSPITAL | Age: 49
End: 2024-03-28
Payer: COMMERCIAL

## 2024-03-28 ENCOUNTER — HOSPITAL ENCOUNTER (OUTPATIENT)
Facility: HOSPITAL | Age: 49
Discharge: HOME | End: 2024-03-28
Attending: OBSTETRICS & GYNECOLOGY | Admitting: OBSTETRICS & GYNECOLOGY
Payer: COMMERCIAL

## 2024-03-28 VITALS
TEMPERATURE: 98.1 F | BODY MASS INDEX: 26.63 KG/M2 | RESPIRATION RATE: 16 BRPM | HEIGHT: 64 IN | SYSTOLIC BLOOD PRESSURE: 110 MMHG | DIASTOLIC BLOOD PRESSURE: 66 MMHG | HEART RATE: 75 BPM | WEIGHT: 156 LBS | OXYGEN SATURATION: 97 %

## 2024-03-28 DIAGNOSIS — C53.9 MALIGNANT NEOPLASM OF CERVIX, UNSPECIFIED SITE: ICD-10-CM

## 2024-03-28 LAB
ALBUMIN SERPL-MCNC: 4.6 G/DL (ref 3.5–5.2)
ALBUMIN/GLOB SERPL: 1.6 G/DL
ALP SERPL-CCNC: 81 U/L (ref 39–117)
ALT SERPL W P-5'-P-CCNC: 19 U/L (ref 1–33)
ANION GAP SERPL CALCULATED.3IONS-SCNC: 8 MMOL/L (ref 5–15)
AST SERPL-CCNC: 21 U/L (ref 1–32)
B-HCG UR QL: NEGATIVE
BILIRUB SERPL-MCNC: 0.4 MG/DL (ref 0–1.2)
BUN SERPL-MCNC: 8 MG/DL (ref 6–20)
BUN/CREAT SERPL: 10.8 (ref 7–25)
CALCIUM SPEC-SCNC: 9.2 MG/DL (ref 8.6–10.5)
CHLORIDE SERPL-SCNC: 100 MMOL/L (ref 98–107)
CO2 SERPL-SCNC: 28 MMOL/L (ref 22–29)
CREAT SERPL-MCNC: 0.74 MG/DL (ref 0.57–1)
DEPRECATED RDW RBC AUTO: 48 FL (ref 37–54)
EGFRCR SERPLBLD CKD-EPI 2021: 99.3 ML/MIN/1.73
ERYTHROCYTE [DISTWIDTH] IN BLOOD BY AUTOMATED COUNT: 13.6 % (ref 12.3–15.4)
EXPIRATION DATE: NORMAL
GLOBULIN UR ELPH-MCNC: 2.8 GM/DL
GLUCOSE SERPL-MCNC: 96 MG/DL (ref 65–99)
HCT VFR BLD AUTO: 40.9 % (ref 34–46.6)
HGB BLD-MCNC: 13 G/DL (ref 12–15.9)
INTERNAL NEGATIVE CONTROL: NEGATIVE
INTERNAL POSITIVE CONTROL: POSITIVE
Lab: NORMAL
MCH RBC QN AUTO: 30.4 PG (ref 26.6–33)
MCHC RBC AUTO-ENTMCNC: 31.8 G/DL (ref 31.5–35.7)
MCV RBC AUTO: 95.6 FL (ref 79–97)
PLATELET # BLD AUTO: 292 10*3/MM3 (ref 140–450)
PMV BLD AUTO: 9.6 FL (ref 6–12)
POTASSIUM SERPL-SCNC: 3.9 MMOL/L (ref 3.5–5.2)
PROT SERPL-MCNC: 7.4 G/DL (ref 6–8.5)
RBC # BLD AUTO: 4.28 10*6/MM3 (ref 3.77–5.28)
SODIUM SERPL-SCNC: 136 MMOL/L (ref 136–145)
WBC NRBC COR # BLD AUTO: 6.48 10*3/MM3 (ref 3.4–10.8)

## 2024-03-28 PROCEDURE — 58571 TLH W/T/O 250 G OR LESS: CPT | Performed by: PHYSICIAN ASSISTANT

## 2024-03-28 PROCEDURE — 25010000002 PROPOFOL 10 MG/ML EMULSION: Performed by: NURSE ANESTHETIST, CERTIFIED REGISTERED

## 2024-03-28 PROCEDURE — 25010000002 DROPERIDOL PER 5 MG

## 2024-03-28 PROCEDURE — 25810000003 LACTATED RINGERS PER 1000 ML: Performed by: ANESTHESIOLOGY

## 2024-03-28 PROCEDURE — 80053 COMPREHEN METABOLIC PANEL: CPT | Performed by: OBSTETRICS & GYNECOLOGY

## 2024-03-28 PROCEDURE — 25010000002 HEPARIN (PORCINE) PER 1000 UNITS: Performed by: OBSTETRICS & GYNECOLOGY

## 2024-03-28 PROCEDURE — 88342 IMHCHEM/IMCYTCHM 1ST ANTB: CPT | Performed by: OBSTETRICS & GYNECOLOGY

## 2024-03-28 PROCEDURE — 25010000002 FENTANYL CITRATE (PF) 50 MCG/ML SOLUTION

## 2024-03-28 PROCEDURE — G0378 HOSPITAL OBSERVATION PER HR: HCPCS

## 2024-03-28 PROCEDURE — 85027 COMPLETE CBC AUTOMATED: CPT | Performed by: OBSTETRICS & GYNECOLOGY

## 2024-03-28 PROCEDURE — 25010000002 FENTANYL CITRATE (PF) 100 MCG/2ML SOLUTION: Performed by: NURSE ANESTHETIST, CERTIFIED REGISTERED

## 2024-03-28 PROCEDURE — 25810000003 SODIUM CHLORIDE PER 500 ML: Performed by: OBSTETRICS & GYNECOLOGY

## 2024-03-28 PROCEDURE — 58571 TLH W/T/O 250 G OR LESS: CPT | Performed by: OBSTETRICS & GYNECOLOGY

## 2024-03-28 PROCEDURE — 25010000002 CEFAZOLIN PER 500 MG: Performed by: OBSTETRICS & GYNECOLOGY

## 2024-03-28 PROCEDURE — 25010000002 DEXAMETHASONE PER 1 MG: Performed by: NURSE ANESTHETIST, CERTIFIED REGISTERED

## 2024-03-28 PROCEDURE — 25010000002 SUGAMMADEX 200 MG/2ML SOLUTION: Performed by: NURSE ANESTHETIST, CERTIFIED REGISTERED

## 2024-03-28 PROCEDURE — 88341 IMHCHEM/IMCYTCHM EA ADD ANTB: CPT | Performed by: OBSTETRICS & GYNECOLOGY

## 2024-03-28 PROCEDURE — 81025 URINE PREGNANCY TEST: CPT | Performed by: ANESTHESIOLOGY

## 2024-03-28 PROCEDURE — 88305 TISSUE EXAM BY PATHOLOGIST: CPT | Performed by: OBSTETRICS & GYNECOLOGY

## 2024-03-28 PROCEDURE — 88309 TISSUE EXAM BY PATHOLOGIST: CPT | Performed by: OBSTETRICS & GYNECOLOGY

## 2024-03-28 PROCEDURE — 25010000002 HYDROMORPHONE 1 MG/ML SOLUTION

## 2024-03-28 PROCEDURE — 25010000002 ONDANSETRON PER 1 MG: Performed by: NURSE ANESTHETIST, CERTIFIED REGISTERED

## 2024-03-28 RX ORDER — NALOXONE HYDROCHLORIDE 4 MG/.1ML
SPRAY NASAL
Qty: 2 EACH | Refills: 0 | Status: SHIPPED | OUTPATIENT
Start: 2024-03-28

## 2024-03-28 RX ORDER — PROMETHAZINE HYDROCHLORIDE 25 MG/1
25 SUPPOSITORY RECTAL ONCE AS NEEDED
Status: DISCONTINUED | OUTPATIENT
Start: 2024-03-28 | End: 2024-03-28 | Stop reason: HOSPADM

## 2024-03-28 RX ORDER — LIDOCAINE HYDROCHLORIDE 10 MG/ML
0.5 INJECTION, SOLUTION EPIDURAL; INFILTRATION; INTRACAUDAL; PERINEURAL ONCE AS NEEDED
Status: COMPLETED | OUTPATIENT
Start: 2024-03-28 | End: 2024-03-28

## 2024-03-28 RX ORDER — DEXMEDETOMIDINE HYDROCHLORIDE 4 UG/ML
INJECTION, SOLUTION INTRAVENOUS AS NEEDED
Status: DISCONTINUED | OUTPATIENT
Start: 2024-03-28 | End: 2024-03-28 | Stop reason: SURG

## 2024-03-28 RX ORDER — PROMETHAZINE HYDROCHLORIDE 25 MG/1
25 TABLET ORAL ONCE AS NEEDED
Status: DISCONTINUED | OUTPATIENT
Start: 2024-03-28 | End: 2024-03-28 | Stop reason: HOSPADM

## 2024-03-28 RX ORDER — LIDOCAINE HYDROCHLORIDE 40 MG/ML
SOLUTION TOPICAL AS NEEDED
Status: DISCONTINUED | OUTPATIENT
Start: 2024-03-28 | End: 2024-03-28 | Stop reason: SURG

## 2024-03-28 RX ORDER — LIDOCAINE HYDROCHLORIDE 10 MG/ML
INJECTION, SOLUTION EPIDURAL; INFILTRATION; INTRACAUDAL; PERINEURAL AS NEEDED
Status: DISCONTINUED | OUTPATIENT
Start: 2024-03-28 | End: 2024-03-28 | Stop reason: SURG

## 2024-03-28 RX ORDER — CELECOXIB 200 MG/1
200 CAPSULE ORAL ONCE
Status: COMPLETED | OUTPATIENT
Start: 2024-03-28 | End: 2024-03-28

## 2024-03-28 RX ORDER — HYDROMORPHONE HYDROCHLORIDE 1 MG/ML
0.5 INJECTION, SOLUTION INTRAMUSCULAR; INTRAVENOUS; SUBCUTANEOUS
Status: DISCONTINUED | OUTPATIENT
Start: 2024-03-28 | End: 2024-03-28 | Stop reason: HOSPADM

## 2024-03-28 RX ORDER — OXYCODONE HYDROCHLORIDE 5 MG/1
5 TABLET ORAL EVERY 6 HOURS PRN
Qty: 5 TABLET | Refills: 0 | Status: SHIPPED | OUTPATIENT
Start: 2024-03-28

## 2024-03-28 RX ORDER — HYDRALAZINE HYDROCHLORIDE 20 MG/ML
5 INJECTION INTRAMUSCULAR; INTRAVENOUS
Status: DISCONTINUED | OUTPATIENT
Start: 2024-03-28 | End: 2024-03-28 | Stop reason: HOSPADM

## 2024-03-28 RX ORDER — SODIUM CHLORIDE, SODIUM LACTATE, POTASSIUM CHLORIDE, CALCIUM CHLORIDE 600; 310; 30; 20 MG/100ML; MG/100ML; MG/100ML; MG/100ML
9 INJECTION, SOLUTION INTRAVENOUS CONTINUOUS
Status: DISCONTINUED | OUTPATIENT
Start: 2024-03-28 | End: 2024-03-28 | Stop reason: HOSPADM

## 2024-03-28 RX ORDER — PREGABALIN 150 MG/1
150 CAPSULE ORAL ONCE
Status: COMPLETED | OUTPATIENT
Start: 2024-03-28 | End: 2024-03-28

## 2024-03-28 RX ORDER — SODIUM CHLORIDE 9 MG/ML
INJECTION, SOLUTION INTRAVENOUS AS NEEDED
Status: DISCONTINUED | OUTPATIENT
Start: 2024-03-28 | End: 2024-03-28 | Stop reason: HOSPADM

## 2024-03-28 RX ORDER — PROPOFOL 10 MG/ML
VIAL (ML) INTRAVENOUS AS NEEDED
Status: DISCONTINUED | OUTPATIENT
Start: 2024-03-28 | End: 2024-03-28 | Stop reason: SURG

## 2024-03-28 RX ORDER — ONDANSETRON 2 MG/ML
INJECTION INTRAMUSCULAR; INTRAVENOUS AS NEEDED
Status: DISCONTINUED | OUTPATIENT
Start: 2024-03-28 | End: 2024-03-28 | Stop reason: SURG

## 2024-03-28 RX ORDER — NALOXONE HCL 0.4 MG/ML
0.4 VIAL (ML) INJECTION AS NEEDED
Status: DISCONTINUED | OUTPATIENT
Start: 2024-03-28 | End: 2024-03-28 | Stop reason: HOSPADM

## 2024-03-28 RX ORDER — ACETAMINOPHEN 325 MG/1
650 TABLET ORAL ONCE
Status: DISCONTINUED | OUTPATIENT
Start: 2024-03-28 | End: 2024-03-28 | Stop reason: HOSPADM

## 2024-03-28 RX ORDER — ONDANSETRON 2 MG/ML
4 INJECTION INTRAMUSCULAR; INTRAVENOUS ONCE AS NEEDED
Status: DISCONTINUED | OUTPATIENT
Start: 2024-03-28 | End: 2024-03-28 | Stop reason: HOSPADM

## 2024-03-28 RX ORDER — HEPARIN SODIUM 5000 [USP'U]/ML
5000 INJECTION, SOLUTION INTRAVENOUS; SUBCUTANEOUS ONCE
Status: DISCONTINUED | OUTPATIENT
Start: 2024-03-28 | End: 2024-03-28 | Stop reason: HOSPADM

## 2024-03-28 RX ORDER — MAGNESIUM HYDROXIDE 1200 MG/15ML
LIQUID ORAL AS NEEDED
Status: DISCONTINUED | OUTPATIENT
Start: 2024-03-28 | End: 2024-03-28 | Stop reason: HOSPADM

## 2024-03-28 RX ORDER — MEPERIDINE HYDROCHLORIDE 25 MG/ML
12.5 INJECTION INTRAMUSCULAR; INTRAVENOUS; SUBCUTANEOUS
Status: DISCONTINUED | OUTPATIENT
Start: 2024-03-28 | End: 2024-03-28 | Stop reason: HOSPADM

## 2024-03-28 RX ORDER — SODIUM CHLORIDE 0.9 % (FLUSH) 0.9 %
3 SYRINGE (ML) INJECTION EVERY 12 HOURS SCHEDULED
Status: DISCONTINUED | OUTPATIENT
Start: 2024-03-28 | End: 2024-03-28 | Stop reason: HOSPADM

## 2024-03-28 RX ORDER — DROPERIDOL 2.5 MG/ML
0.62 INJECTION, SOLUTION INTRAMUSCULAR; INTRAVENOUS ONCE AS NEEDED
Status: DISCONTINUED | OUTPATIENT
Start: 2024-03-28 | End: 2024-03-28 | Stop reason: HOSPADM

## 2024-03-28 RX ORDER — FENTANYL CITRATE 50 UG/ML
INJECTION, SOLUTION INTRAMUSCULAR; INTRAVENOUS AS NEEDED
Status: DISCONTINUED | OUTPATIENT
Start: 2024-03-28 | End: 2024-03-28 | Stop reason: SURG

## 2024-03-28 RX ORDER — HYDROCODONE BITARTRATE AND ACETAMINOPHEN 5; 325 MG/1; MG/1
1 TABLET ORAL ONCE AS NEEDED
Status: DISCONTINUED | OUTPATIENT
Start: 2024-03-28 | End: 2024-03-28 | Stop reason: HOSPADM

## 2024-03-28 RX ORDER — FAMOTIDINE 20 MG/1
20 TABLET, FILM COATED ORAL ONCE
Status: COMPLETED | OUTPATIENT
Start: 2024-03-28 | End: 2024-03-28

## 2024-03-28 RX ORDER — ACETAMINOPHEN 500 MG
1000 TABLET ORAL ONCE
Status: COMPLETED | OUTPATIENT
Start: 2024-03-28 | End: 2024-03-28

## 2024-03-28 RX ORDER — LABETALOL HYDROCHLORIDE 5 MG/ML
5 INJECTION, SOLUTION INTRAVENOUS
Status: DISCONTINUED | OUTPATIENT
Start: 2024-03-28 | End: 2024-03-28 | Stop reason: HOSPADM

## 2024-03-28 RX ORDER — FENTANYL CITRATE 50 UG/ML
50 INJECTION, SOLUTION INTRAMUSCULAR; INTRAVENOUS
Status: DISCONTINUED | OUTPATIENT
Start: 2024-03-28 | End: 2024-03-28 | Stop reason: HOSPADM

## 2024-03-28 RX ORDER — IPRATROPIUM BROMIDE AND ALBUTEROL SULFATE 2.5; .5 MG/3ML; MG/3ML
3 SOLUTION RESPIRATORY (INHALATION) ONCE AS NEEDED
Status: DISCONTINUED | OUTPATIENT
Start: 2024-03-28 | End: 2024-03-28 | Stop reason: HOSPADM

## 2024-03-28 RX ORDER — OXYCODONE HYDROCHLORIDE 5 MG/1
5 TABLET ORAL EVERY 6 HOURS PRN
Qty: 5 TABLET | Refills: 0 | OUTPATIENT
Start: 2024-03-28 | End: 2024-03-28 | Stop reason: HOSPADM

## 2024-03-28 RX ORDER — ROCURONIUM BROMIDE 10 MG/ML
INJECTION, SOLUTION INTRAVENOUS AS NEEDED
Status: DISCONTINUED | OUTPATIENT
Start: 2024-03-28 | End: 2024-03-28 | Stop reason: SURG

## 2024-03-28 RX ORDER — HEPARIN SODIUM 5000 [USP'U]/ML
INJECTION, SOLUTION INTRAVENOUS; SUBCUTANEOUS AS NEEDED
Status: DISCONTINUED | OUTPATIENT
Start: 2024-03-28 | End: 2024-03-28 | Stop reason: HOSPADM

## 2024-03-28 RX ORDER — SCOLOPAMINE TRANSDERMAL SYSTEM 1 MG/1
1 PATCH, EXTENDED RELEASE TRANSDERMAL CONTINUOUS
Status: DISCONTINUED | OUTPATIENT
Start: 2024-03-28 | End: 2024-03-28 | Stop reason: HOSPADM

## 2024-03-28 RX ORDER — IBUPROFEN 600 MG/1
600 TABLET ORAL EVERY 6 HOURS PRN
Status: DISCONTINUED | OUTPATIENT
Start: 2024-03-28 | End: 2024-03-28 | Stop reason: HOSPADM

## 2024-03-28 RX ORDER — BUPIVACAINE HYDROCHLORIDE AND EPINEPHRINE 5; 5 MG/ML; UG/ML
INJECTION, SOLUTION PERINEURAL AS NEEDED
Status: DISCONTINUED | OUTPATIENT
Start: 2024-03-28 | End: 2024-03-28 | Stop reason: HOSPADM

## 2024-03-28 RX ORDER — FENTANYL CITRATE 50 UG/ML
INJECTION, SOLUTION INTRAMUSCULAR; INTRAVENOUS
Status: COMPLETED
Start: 2024-03-28 | End: 2024-03-28

## 2024-03-28 RX ORDER — MIDAZOLAM HYDROCHLORIDE 1 MG/ML
1 INJECTION INTRAMUSCULAR; INTRAVENOUS
Status: DISCONTINUED | OUTPATIENT
Start: 2024-03-28 | End: 2024-03-28 | Stop reason: HOSPADM

## 2024-03-28 RX ORDER — SODIUM CHLORIDE 0.9 % (FLUSH) 0.9 %
3-10 SYRINGE (ML) INJECTION AS NEEDED
Status: DISCONTINUED | OUTPATIENT
Start: 2024-03-28 | End: 2024-03-28 | Stop reason: HOSPADM

## 2024-03-28 RX ORDER — POLYETHYLENE GLYCOL 3350 17 G/17G
17 POWDER, FOR SOLUTION ORAL DAILY
Qty: 238 G | Refills: 3 | Status: SHIPPED | OUTPATIENT
Start: 2024-03-28

## 2024-03-28 RX ORDER — ACETAMINOPHEN 325 MG/1
650 TABLET ORAL EVERY 4 HOURS PRN
Qty: 60 TABLET | Refills: 1 | Status: SHIPPED | OUTPATIENT
Start: 2024-03-28

## 2024-03-28 RX ORDER — ONDANSETRON 4 MG/1
4 TABLET, ORALLY DISINTEGRATING ORAL ONCE AS NEEDED
Status: DISCONTINUED | OUTPATIENT
Start: 2024-03-28 | End: 2024-03-28 | Stop reason: HOSPADM

## 2024-03-28 RX ORDER — IBUPROFEN 600 MG/1
600 TABLET ORAL EVERY 6 HOURS PRN
Qty: 30 TABLET | Refills: 2 | Status: SHIPPED | OUTPATIENT
Start: 2024-03-28

## 2024-03-28 RX ORDER — SODIUM CHLORIDE 9 MG/ML
40 INJECTION, SOLUTION INTRAVENOUS AS NEEDED
Status: DISCONTINUED | OUTPATIENT
Start: 2024-03-28 | End: 2024-03-28 | Stop reason: HOSPADM

## 2024-03-28 RX ORDER — DROPERIDOL 2.5 MG/ML
INJECTION, SOLUTION INTRAMUSCULAR; INTRAVENOUS
Status: COMPLETED
Start: 2024-03-28 | End: 2024-03-28

## 2024-03-28 RX ORDER — PROMETHAZINE HYDROCHLORIDE 12.5 MG/1
12.5 TABLET ORAL ONCE AS NEEDED
Status: DISCONTINUED | OUTPATIENT
Start: 2024-03-28 | End: 2024-03-28 | Stop reason: HOSPADM

## 2024-03-28 RX ORDER — DEXAMETHASONE SODIUM PHOSPHATE 4 MG/ML
INJECTION, SOLUTION INTRA-ARTICULAR; INTRALESIONAL; INTRAMUSCULAR; INTRAVENOUS; SOFT TISSUE AS NEEDED
Status: DISCONTINUED | OUTPATIENT
Start: 2024-03-28 | End: 2024-03-28 | Stop reason: SURG

## 2024-03-28 RX ORDER — DROPERIDOL 2.5 MG/ML
0.62 INJECTION, SOLUTION INTRAMUSCULAR; INTRAVENOUS
Status: DISCONTINUED | OUTPATIENT
Start: 2024-03-28 | End: 2024-03-28 | Stop reason: HOSPADM

## 2024-03-28 RX ORDER — ONDANSETRON 4 MG/1
4 TABLET, FILM COATED ORAL EVERY 6 HOURS PRN
Qty: 5 TABLET | Refills: 0 | Status: SHIPPED | OUTPATIENT
Start: 2024-03-28

## 2024-03-28 RX ADMIN — DEXMEDETOMIDINE HYDROCHLORIDE IN 0.9% SODIUM CHLORIDE 8 MCG: 4 INJECTION INTRAVENOUS at 14:16

## 2024-03-28 RX ADMIN — PROPOFOL 150 MG: 10 INJECTION, EMULSION INTRAVENOUS at 13:29

## 2024-03-28 RX ADMIN — CELECOXIB 200 MG: 200 CAPSULE ORAL at 12:33

## 2024-03-28 RX ADMIN — SODIUM CHLORIDE, POTASSIUM CHLORIDE, SODIUM LACTATE AND CALCIUM CHLORIDE 9 ML/HR: 600; 310; 30; 20 INJECTION, SOLUTION INTRAVENOUS at 12:27

## 2024-03-28 RX ADMIN — HYDROMORPHONE HYDROCHLORIDE 0.5 MG: 1 INJECTION, SOLUTION INTRAMUSCULAR; INTRAVENOUS; SUBCUTANEOUS at 15:24

## 2024-03-28 RX ADMIN — SODIUM CHLORIDE, POTASSIUM CHLORIDE, SODIUM LACTATE AND CALCIUM CHLORIDE: 600; 310; 30; 20 INJECTION, SOLUTION INTRAVENOUS at 14:26

## 2024-03-28 RX ADMIN — SODIUM CHLORIDE 2000 MG: 900 INJECTION INTRAVENOUS at 13:38

## 2024-03-28 RX ADMIN — LIDOCAINE HYDROCHLORIDE 1 EACH: 40 SOLUTION TOPICAL at 13:31

## 2024-03-28 RX ADMIN — FAMOTIDINE 20 MG: 20 TABLET, FILM COATED ORAL at 12:33

## 2024-03-28 RX ADMIN — DEXAMETHASONE SODIUM PHOSPHATE 8 MG: 4 INJECTION INTRA-ARTICULAR; INTRALESIONAL; INTRAMUSCULAR; INTRAVENOUS; SOFT TISSUE at 13:53

## 2024-03-28 RX ADMIN — ACETAMINOPHEN 1000 MG: 500 TABLET ORAL at 12:33

## 2024-03-28 RX ADMIN — ROCURONIUM BROMIDE 50 MG: 10 INJECTION INTRAVENOUS at 13:30

## 2024-03-28 RX ADMIN — DROPERIDOL 0.62 MG: 2.5 INJECTION, SOLUTION INTRAMUSCULAR; INTRAVENOUS at 15:25

## 2024-03-28 RX ADMIN — ROCURONIUM BROMIDE 20 MG: 10 INJECTION INTRAVENOUS at 14:00

## 2024-03-28 RX ADMIN — PREGABALIN 150 MG: 150 CAPSULE ORAL at 12:33

## 2024-03-28 RX ADMIN — LIDOCAINE HYDROCHLORIDE 0.5 ML: 10 INJECTION, SOLUTION EPIDURAL; INFILTRATION; INTRACAUDAL; PERINEURAL at 12:27

## 2024-03-28 RX ADMIN — FENTANYL CITRATE 100 MCG: 50 INJECTION, SOLUTION INTRAMUSCULAR; INTRAVENOUS at 13:27

## 2024-03-28 RX ADMIN — FENTANYL CITRATE 50 MCG: 50 INJECTION, SOLUTION INTRAMUSCULAR; INTRAVENOUS at 15:15

## 2024-03-28 RX ADMIN — LIDOCAINE HYDROCHLORIDE 100 MG: 10 INJECTION, SOLUTION EPIDURAL; INFILTRATION; INTRACAUDAL; PERINEURAL at 13:29

## 2024-03-28 RX ADMIN — SUGAMMADEX 200 MG: 100 INJECTION, SOLUTION INTRAVENOUS at 14:47

## 2024-03-28 RX ADMIN — SCOPOLAMINE 1 PATCH: 1.5 PATCH, EXTENDED RELEASE TRANSDERMAL at 12:33

## 2024-03-28 RX ADMIN — ONDANSETRON 4 MG: 2 INJECTION INTRAMUSCULAR; INTRAVENOUS at 14:44

## 2024-03-28 RX ADMIN — PROPOFOL 25 MCG/KG/MIN: 10 INJECTION, EMULSION INTRAVENOUS at 13:35

## 2024-03-28 RX ADMIN — DEXMEDETOMIDINE HYDROCHLORIDE IN 0.9% SODIUM CHLORIDE 8 MCG: 4 INJECTION INTRAVENOUS at 13:27

## 2024-03-28 NOTE — ANESTHESIA PROCEDURE NOTES
Airway  Urgency: elective    Date/Time: 3/28/2024 1:31 PM  Airway not difficult    General Information and Staff    Patient location during procedure: OR  CRNA/CAA: Susie Melendrez CRNA    Indications and Patient Condition  Indications for airway management: airway protection    Preoxygenated: yes  MILS not maintained throughout  Mask difficulty assessment: 1 - vent by mask    Final Airway Details  Final airway type: endotracheal airway      Successful airway: ETT  Cuffed: yes   Successful intubation technique: video laryngoscopy  Facilitating devices/methods: intubating stylet  Endotracheal tube insertion site: oral  Blade: [place]  Blade size: D  ETT size (mm): 7.0  Cormack-Lehane Classification: grade I - full view of glottis  Placement verified by: chest auscultation and capnometry   Cuff volume (mL): 7  Measured from: lips  ETT/EBT  to lips (cm): 22  Number of attempts at approach: 1  Assessment: lips, teeth, and gum same as pre-op and atraumatic intubation    Additional Comments  [place] used electively. Negative epigastric sounds, Breath sound equal bilaterally with symmetric chest rise and fall.

## 2024-03-28 NOTE — ANESTHESIA PREPROCEDURE EVALUATION
Anesthesia Evaluation     Patient summary reviewed and Nursing notes reviewed   history of anesthetic complications:   NPO Solid Status: > 8 hours  NPO Liquid Status: > 8 hours           Airway   Mallampati: II  TM distance: >3 FB  Neck ROM: full  No difficulty expected  Dental      Pulmonary - normal exam   Cardiovascular - normal exam    (+) hypertension      Neuro/Psych  GI/Hepatic/Renal/Endo      Musculoskeletal     Abdominal    Substance History      OB/GYN          Other                    Anesthesia Plan    ASA 2     general     intravenous induction     Anesthetic plan, risks, benefits, and alternatives have been provided, discussed and informed consent has been obtained with: patient.    Plan discussed with CRNA.    CODE STATUS:

## 2024-03-28 NOTE — DISCHARGE INSTRUCTIONS
1) No driving for 1-2 weeks and no longer taking narcotics.   2) May shower / sponge bathe >24 hours after surgery, No tub baths/soaking  3) Do not lift / push / pull more then 20 lb. for 6 weeks  4) Pelvic rest for 6 weeks  5) Constipation is a common postoperative complaint.  Please use a stool softeners and laxatives as needed to facilitate bowel movements.  6) If you are discharged with an abdominal binder, this is to be used as needed for incisional comfort.

## 2024-03-28 NOTE — ANESTHESIA POSTPROCEDURE EVALUATION
Patient: Delia Ann    Procedure Summary       Date: 03/28/24 Room / Location:  [facility]  [facility]    Anesthesia Start: 1324 Anesthesia Stop: 1507    Procedure: TOTAL LAPAROSCOPIC HYSTERECTOMY BILATERAL SALPINGECTOMY WITH DAVINCI ROBOT (Abdomen) Diagnosis:       Malignant neoplasm of cervix, unspecified site      (Malignant neoplasm of cervix, unspecified site [C53.9])    Surgeons: Alexia Rowe MD Provider: Darion Amaro Jr., MD    Anesthesia Type: general ASA Status: 2            Anesthesia Type: general    Vitals  Vitals Value Taken Time   /53 03/28/24 1503   Temp     Pulse 74 03/28/24 1507   Resp     SpO2 97 % 03/28/24 1507   Vitals shown include unfiled device data.        Post Anesthesia Care and Evaluation    Patient location during evaluation: PACU  Patient participation: waiting for patient participation  Pain management: adequate    Airway patency: patent  Anesthetic complications: No anesthetic complications  PONV Status: none  Cardiovascular status: hemodynamically stable and acceptable  Respiratory status: nonlabored ventilation, acceptable, nasal cannula and oral airway  Hydration status: acceptable    Comments: /53  HR 73  Sats 96  Temp 97.4

## 2024-03-28 NOTE — OP NOTE
Subjective     Date of Service:  03/28/24  Time of Service:  14:56 EDT    Surgical Staff: Surgeons and Role:     * Alexia Rowe MD - Primary     * Jesi Dowell MD - Resident - Assisting   Additional Staff:   Assistant: Jaron Edge PA-C  was responsible for performing the following activities: Retraction, Suction, Irrigation, Closing, and Placing Dressing and their skilled assistance was necessary for the success of this case.     Pre-operative diagnosis(es): Pre-Op Diagnosis Codes:     * Malignant neoplasm of cervix, unspecified site [C53.9]     Post-operative diagnosis(es): Post-Op Diagnosis Codes:     * Malignant neoplasm of cervix, unspecified site [C53.9]   Procedure(s): Procedure(s):  TOTAL LAPAROSCOPIC HYSTERECTOMY BILATERAL SALPINGECTOMY WITH DAVINCI ROBOT     Antibiotics: cefazolin (Ancef) ordered on call to OR     Anesthesia: Type: General  ASA:  II     Objective      Operative findings: Cervix consistent with prior conization.  Unremarkable adnexa bilaterally except for prior tubal ligation.  Due to close right posterior margin (7:00) additional vagina was removed in this area.   Specimens removed: ID Type Source Tests Collected by Time   A :  Tissue Uterus, Cervix, Bilateral Fallopian Tubes  TISSUE PATHOLOGY EXAM Alexia Rowe MD 3/28/2024 1407   B : vagival margin Tissue Vagina TISSUE PATHOLOGY EXAM Alexia Rowe MD 3/28/2024 1429      Fluid Intake and Output: I/O this shift:  In: 1100 [I.V.:1000; IV Piggyback:100]  Out: 75 [Urine:75] estimated blood loss 50 mL.   Blood products used: No   Drains: [REMOVED] Urethral Catheter Silicone 16 Fr. (Removed)      Implant Information: Nothing was implanted during the procedure   Complications: No immediate   Intraoperative consult(s):    Condition: stable   Disposition: to PACU and then discharge home       Indications:  Patient's pleasant 48-year-old woman who is diagnosed with a microinvasive cervical cancer, stage Ia 1.  Risks of benefits  of surgery were discussed.  Consent was signed and on chart.    Procedure: After obtaining informed consent, the patient was taken to the operating room and underwent general endotracheal anesthesia after patient and site verification. The feet were placed in Dexter stirrups. The arms were tucked at the sides. Steep Trendelenburg positioning was tested and found to be adequate. The abdomen, perineum, and vagina were prepped and draped in the usual sterile fashion. Booth catheter was anchored. Retractors were used to visualize the cervix.  Cervix was sounded and the appropriate uterine manipulator was called for.  Uterine manipulator was secured with out difficulty.  Attention was turned to the abdomen. Prior to each incision, skin was injected with 0.5% Marcaine with epinephrine.  Varies needle was inserted at the umbilicus and the abdomen was insufflated to pressure 15 mmHg with CO2 gas.  An 8 mm trocar was inserted at the umbilical midline position without difficulty.  Laparoscope was introduced to confirm positioning. Steep Trendelenburg was called for.  2 left-sided 8 mm and 2 right-sided 8 mm trochars were all placed under direct visualization.  The above findings were noted.  Da Susana robot was docked to the patient and surgeon retired to the operating console.    Fallopian tubes were elevated and divided from underlying structures.  Utero-ovarian ligaments were isolated from surrounding structures and pedicles were created using bipolar cautery and scissors. Likewise, the round ligaments were divided. Anterior and posterior leaves of the broad ligament were divided with monopolar scissors. A bladder flap was developed.  Uterine vessels were isolated. Pedicles were created at the level of the uterine vessels using bipolar cautery and scissors. Cardinal ligament and uterosacral ligament complexes were divided in a similar fashion. Monopolar scissors were used to perform a circumferential colpotomy and the  specimen was handed off intact via the vagina for permanent pathology.  Additional right posterior vaginal margin was obtained using monopolar scissors.    The vaginal cuff was closed with 0 Vicryl suture in a running locking fashion x2 layers. Good hemostasis was noted. Additional hemostasis was achieved at the pelvis as needed using bipolar cautery. Da Susana robot was undocked from the patient and the surgeon returned to the bedside.  Trochars were removed under direct visualization.  CO2 gas was allowed to escape from the abdominal cavity. At that point, no fascial defects could be palpated.  The skin was closed with 3-0 Monocryl in subcuticular stitch and glue was placed at the skin. The vagina was inspected.  Occluder and all instruments had been removed from the vagina.  Good hemostasis was noted at the vagina.  Bladder was back filled with 120 mL of sterile solution and the lawrence was discontinued.  The patient was taken to the recovery room in good condition. There were no immediate complications. All counts were correct.          Alexia Rowe MD  03/28/24  14:55 EDT

## 2024-03-28 NOTE — INTERVAL H&P NOTE
"[facility] Pre-op    Full history and physical note from office is attached.    /73 (BP Location: Right arm, Patient Position: Lying)   Pulse 86   Temp 98.7 °F (37.1 °C) (Temporal)   Resp 16   Ht 162.6 cm (64.02\")   Wt 70.8 kg (156 lb)   SpO2 98%   BMI 26.76 kg/m²     Immunizations:  Influenza:  No  Pneumococcal:  No  Tetanus:  Unknown  Covid : No    LAB Results:  Lab Results   Component Value Date    WBC 6.2 10/13/2020    HGB 12.2 10/13/2020    HCT 36.9 10/13/2020    MCV 96.9 10/13/2020     10/13/2020    NEUTROABS 2.9 10/13/2020    BUN 11 10/13/2020    CREATININE 0.92 10/13/2020    EGFRIFNONA 75 10/13/2020    EGFRIFAFRI 86 10/13/2020     10/13/2020    K 4.1 10/13/2020     10/13/2020    CO2 28 10/13/2020    CALCIUM 9.4 10/13/2020    ALBUMIN 4.3 10/13/2020    AST 15 10/13/2020    ALT 10 10/13/2020    BILITOT 0.2 10/13/2020     DATE COLLECTED      DATE RECEIVED      DATE REPORTED  02/15/2024          02/15/2024         02/19/2024    DIAGNOSIS:  A.   CERVIX, CONIZATION, LEEP, TOP HALF:  Invasive moderately differentiated squamous cell carcinoma in a  background of extensive high-grade squamous intraepithelial lesion  extending into endocervical glands  Invasive carcinoma measures 2 mm wide with a depth of invasion of 1 mm  All margins free of invasive carcinoma  HSIL present at endocervical and deep margins via endocervical  glandular extension  B.   CERVIX, CONIZATION, LEEP, TOP HAT:  Cervical transformation zone with high-grade squamous intraepithelial  lesion, extending into endocervical glands  Negative for invasive carcinoma  HSIL present at ectocervical mucosal margin and deep margin via  endocervical glandular extension  C.   CERVIX, CONIZATION, LEEP, BOTTOM HAT:  Cervical transformation zone with high-grade squamous intraepithelial lesion  Negative for invasive carcinoma  All margins benign     Cancer Staging (if applicable)  Cancer Patient: _x_ yes __no " __unknown__N/A; If yes, clinical stage T:__ N:__M:__, stage group or __N/A      Impression: Cervical cancer Stage IA1 (cT1a1, cN0, cM0)       Plan: TOTAL LAPAROSCOPIC HYSTERECTOMY BILATERAL SALPINGECTOMY WITH DAVINCI ROBOT       Xochitl Cam, CASSIA   3/28/2024   12:17 EDT    I saw and evaluated the patient. I agree with the findings and the plan of care as documented in the note.    Alexia Rowe MD  03/28/24  12:26 EDT

## 2024-03-29 ENCOUNTER — NURSE TRIAGE (OUTPATIENT)
Dept: CALL CENTER | Facility: HOSPITAL | Age: 49
End: 2024-03-29
Payer: COMMERCIAL

## 2024-03-30 NOTE — TELEPHONE ENCOUNTER
"Reason for Disposition   Sounds like a serious complication to the triager    Additional Information   Negative: Sounds like a life-threatening emergency to the triager   Negative: Chest pain   Negative: Difficulty breathing   Negative: Acting confused (e.g., disoriented, slurred speech) or excessively sleepy   Negative: Post-Op tonsil and adenoid surgery, symptoms or questions about   Negative: Surgical incision symptoms and questions   Negative: [1] Pain or burning with passing urine (urination) AND [2] male   Negative: [1] Pain or burning with passing urine (urination) AND [2] female   Negative: Constipation   Negative: New or worsening leg (calf, thigh) pain   Negative: New or worsening leg swelling   Negative: Dizziness is severe, or persists > 24 hours after surgery   Negative: Pain, redness, swelling, or pus at IV Site   Negative: Symptoms arising from use of a urinary catheter (e.g., Coude, Booth)   Negative: Cast problems or questions   Negative: Medication question   Negative: [1] Widespread rash AND [2] bright red, sunburn-like   Negative: [1] SEVERE headache AND [2] after spinal (epidural) anesthesia   Negative: [1] Vomiting AND [2] persists > 4 hours   Negative: [1] Vomiting AND [2] abdomen looks much more swollen than usual   Negative: [1] Drinking very little AND [2] dehydration suspected (e.g., no urine > 12 hours, very dry mouth, very lightheaded)   Negative: Patient sounds very sick or weak to the triager    Answer Assessment - Initial Assessment Questions  1. SYMPTOM: \"What's the main symptom you're concerned about?\" (e.g., pain, fever, vomiting)      SOA and chest pain  2. ONSET: \"When did SOA and chest pain  start?\"      Since surgery  3. SURGERY: \"What surgery did you have?\"      Hysterectomy  4. DATE of SURGERY: \"When was the surgery?\"       03/28/24  5. ANESTHESIA: \" What type of anesthesia did you have?\" (e.g., general, spinal, epidural, local)      general  6. PAIN: \"Is there any pain?\" If " "Yes, ask: \"How bad is it?\"  (Scale 1-10; or mild, moderate, severe)      mild  7. FEVER: \"Do you have a fever?\" If Yes, ask: \"What is your temperature, how was it measured, and when did it start?\"      denies  8. VOMITING: \"Is there any vomiting?\" If Yes, ask: \"How many times?\"      denies  9. BLEEDING: \"Is there any bleeding?\" If Yes, ask: \"How much?\" and \"Where?\"      denies  10. OTHER SYMPTOMS: \"Do you have any other symptoms?\" (e.g., drainage from wound, painful urination, constipation)        SOA and chest pain    Protocols used: Post-Op Symptoms and Questions-ADULT-    "

## 2024-03-30 NOTE — TELEPHONE ENCOUNTER
Patient states that she is having SOB and chest pain.  Daughter is a nurse and feels this could be a PE.  Instruction provided per protocol.

## 2024-04-03 ENCOUNTER — TELEPHONE (OUTPATIENT)
Dept: GYNECOLOGIC ONCOLOGY | Facility: CLINIC | Age: 49
End: 2024-04-03
Payer: COMMERCIAL

## 2024-04-03 LAB
CYTO UR: NORMAL
LAB AP CASE REPORT: NORMAL
LAB AP CLINICAL INFORMATION: NORMAL
LAB AP DIAGNOSIS COMMENT: NORMAL
LAB AP SPECIAL STAINS: NORMAL
PATH REPORT.FINAL DX SPEC: NORMAL
PATH REPORT.GROSS SPEC: NORMAL

## 2024-04-03 NOTE — TELEPHONE ENCOUNTER
Patient called for f/u from surgery, sounds bright, denies pain stating is a little sore in lower abdomen but not enough for any medications.  States is transfering well, ambulating well, voiding and having easy BM.  States she feels she is doing very well, is very pleased with her surgery recovery and care. Confirms f/u appointment, agree's to call with any issue or problems.

## 2024-04-03 NOTE — TELEPHONE ENCOUNTER
----- Message from Alexia Rowe MD sent at 4/3/2024  9:39 AM EDT -----  Pls notiy pt that No residual invasive cancer on final pathology, only high grade pre cancer (JONATHAN III).    All margins negative.  F/U as scheduled  Thanks!  ----- Message -----  From: Lab, Background User  Sent: 4/3/2024   9:17 AM EDT  To: Alexia Rowe MD

## 2024-04-03 NOTE — TELEPHONE ENCOUNTER
Patient notified of Providers comments and recommendations for Lab/Path results with verbalized understanding and confirmed next appointment.

## 2024-04-19 ENCOUNTER — OFFICE VISIT (OUTPATIENT)
Dept: GYNECOLOGIC ONCOLOGY | Facility: CLINIC | Age: 49
End: 2024-04-19
Payer: COMMERCIAL

## 2024-04-19 VITALS
HEART RATE: 74 BPM | DIASTOLIC BLOOD PRESSURE: 59 MMHG | OXYGEN SATURATION: 97 % | HEIGHT: 64 IN | RESPIRATION RATE: 17 BRPM | BODY MASS INDEX: 27.5 KG/M2 | TEMPERATURE: 97.3 F | WEIGHT: 161.1 LBS | SYSTOLIC BLOOD PRESSURE: 121 MMHG

## 2024-04-19 DIAGNOSIS — Z98.890 POST-OPERATIVE STATE: Primary | ICD-10-CM

## 2024-04-19 NOTE — PROGRESS NOTES
Delia Ann  [id number]  1975      Reason for Visit: Postoperative evaluation    History of Present Illness:  Patient is a very pleasant 48 y.o. woman who presents for a post operative evaluation status post RA-TLH/BS performed on 3/28.      Surgery and hospital course were uncomplicated.  Today, patient notes normal bowel and bladder function.  Her pain is well controlled. She has questions about resuming normal activities.     Past Medical History, Past Surgical History, Social History, Family History have been reviewed and are without significant changes except as mentioned.    Review of Systems   All other systems were reviewed and are negative except as mentioned above.    Medications:  The current medication list was reviewed in the EMR    ALLERGIES:  No Known Allergies      There were no vitals taken for this visit.          Physical Exam  Constitutional:  Patient is a pleasant woman in no acute distress.  Gastrointestinal: Abdomen is soft and appropriately tender.  There is no mass palpated.  There is no rebound or guarding.  Incisions clean, dry and intact.  Extremities:  Bilateral lower extremities are non-tender.  Gynecologic:External genitalia are free from lesion. On speculum examination, the vaginal cuff was intact and no lesions were appreciated.  On bimanual examination, no fullness was appreciated.  Uterus, cervix and adnexa were absent.  There was no significant tenderness.  Rectovaginal exam was deferred.      PATHOLOGY:  UTERUS, CERVIX, BILATERAL FALLOPIAN TUBES:               Cervix:                  -Histologic changes expected at prior surgical site                  -Focal residual HSIL (JONATHAN III)                  -Negative for residual invasive squamous cell carcinoma (see comment)               Endometrium:                  -Features expected with prior ablation, negative for malignancy               Myometrium:                  -Intramural leiomyoma exhibiting  reparative/ischemic type change               Adnexa:                  -Bilateral benign fallopian tubes  2.  VAGINAL MARGIN:               Benign squamous mucosa with endocervical type glands, negative for intraepithelial neoplasia/malignancy    ASSESSMENT/PLAN:  Delia Ann returns for a post-operative evaluation today.  All pathology reports were discussed with the patient.      Overall, the patient is very pleased with her care.  I recommended continuation of post operative precautions as discussed.     She is to follow-up in 3 months for survivorship     Paras Gallegos MD  Resident PGY-3, Gynecology     Patient was seen and examined with Dr. Gallegos,  resident, who performed portions of the examination and documentation for this patient's care under my direct supervision.  I agree with the above documentation and plan.    Alexia Rowe MD  04/19/24  18:53 EDT

## 2024-07-31 ENCOUNTER — OFFICE VISIT (OUTPATIENT)
Dept: GYNECOLOGIC ONCOLOGY | Facility: CLINIC | Age: 49
End: 2024-07-31
Payer: COMMERCIAL

## 2024-07-31 VITALS
BODY MASS INDEX: 28.42 KG/M2 | DIASTOLIC BLOOD PRESSURE: 67 MMHG | WEIGHT: 166.5 LBS | TEMPERATURE: 98.4 F | HEIGHT: 64 IN | OXYGEN SATURATION: 100 % | RESPIRATION RATE: 18 BRPM | HEART RATE: 86 BPM | SYSTOLIC BLOOD PRESSURE: 119 MMHG

## 2024-07-31 DIAGNOSIS — Z85.41 HISTORY OF CERVICAL CANCER: Primary | ICD-10-CM

## 2024-07-31 DIAGNOSIS — F43.21 SITUATIONAL DEPRESSION: ICD-10-CM

## 2024-07-31 NOTE — PROGRESS NOTES
GYN ONCOLOGY CANCER SURVIVORSHIP VISIT    Delia Ann  [id number]  1975    Subjective   Chief Complaint: Survivorship Visit (Cervical Cancer)       History of present illness:   Delia Ann is a 48 y.o. year old female who is here today for her Cancer Survivorship visit, see oncology history below.  She is status post RA-TLH/BS by Dr Rowe on 3-28-24. She reports she is feeling very well today and and has no complaints. No major changes in health since apt with Dr Rowe in April. She denies vaginal bleeding, pelvic pain, changes in bowel or bladder function, new or concerning lesions, and breast problems. Not sexually active. Does endorse occasionally experiencing dysuria at the start of urination, but denies any persistent or any other UTI sxs.  Retains ovaries-- pt inquires whether it is possible to still experience PMS and other symptoms that are typically associated with menstrual period, just without the monthly bleeding.  States that each month at that time she normally would have her cycle, she still has the same symptoms such as mood fluctuations, irritability, and chocolate craving.    Last pap smear was 8-7-23, negative for intraepithelial lesion or malignancy.  HR-HPV pull positive.  HPV 16, 18/45 negative.     Cancer History:   Oncology/Hematology History   Cervical cancer   2/15/2024 Cancer Staged    Staging form: ONC CERVIX UTERI AJCC V9  - Clinical stage from 2/15/2024: FIGO Stage IA1 (cT1a1, cN0, cM0) - Signed by Alexia Rowe MD on 2/23/2024  Staging comments: Diagnosed on LEEP  2 mm with a 1 mm depth of invasion  PD-L1 CPS 90     2/23/2024 Initial Diagnosis    Cervical cancer     7/31/2024 Survivorship    Survivorship Care Plan completed and discussed with patient.  Copy of Survivorship Care Plan provided to patient and primary care provider.                       The current medication list and allergy list were reviewed and reconciled.     Past Medical History,  "Past Surgical History, Social History, Family History have been reviewed and are without significant changes except as mentioned.        Review of Systems   Constitutional: Negative.    Respiratory: Negative.     Cardiovascular: Negative.    Gastrointestinal: Negative.    Genitourinary: Negative.    Psychiatric/Behavioral: Negative.           Objective   Physical Exam  Vitals and nursing note reviewed.   Constitutional:       General: She is not in acute distress.     Appearance: Normal appearance. She is normal weight.   HENT:      Head: Normocephalic and atraumatic.   Pulmonary:      Effort: Pulmonary effort is normal. No respiratory distress.   Skin:     General: Skin is warm and dry.      Capillary Refill: Capillary refill takes less than 2 seconds.   Neurological:      General: No focal deficit present.      Mental Status: She is alert and oriented to person, place, and time.      Motor: No weakness.      Gait: Gait normal.   Psychiatric:         Attention and Perception: Attention and perception normal.         Mood and Affect: Mood normal. Affect is tearful.         Speech: Speech normal.         Behavior: Behavior normal. Behavior is cooperative.         Thought Content: Thought content normal.         Cognition and Memory: Cognition and memory normal.         Judgment: Judgment normal.       Vital Signs: /67   Pulse 86   Temp 98.4 °F (36.9 °C) (Temporal)   Resp 18   Ht 162.6 cm (64\")   Wt 75.5 kg (166 lb 8 oz)   SpO2 100%   BMI 28.58 kg/m²   Vitals:    07/31/24 1311   PainSc: 0-No pain                                     ECOG score: 0             Result Review :    -Last gyn onc note  -all pathology reports from [id number]       Tumor marker:  No results found for: \"\"    Survivorship Needs Assessment:  Nutrition Services  Health history, treatment course, and weight trends reviewed. Discussed nutrition services offered by [facility] including oncology nutrition, diabetes management, general " outpatient nutrition, exercise counseling, and weight management. The patient is not in need of referral to nutrition services at this time.     PT/Rehab  Health history, treatment course, and current status. The patient is not in need of physical therapy or rehabilitation services for pelvic floor PT.     Behavioral Health  A post-treatment depression screening has been completed. PHQ-9 results show 5-9 (Mild Depression). The patient has not previously established mental health care. A referral is recommended at this time. The patient desires referral to [facility].      Procedure Note:            Assessment and Plan:      H/o cervical cancer  - Interval H&P every 3-6 mo for 2 y (3/2026), every 6-12 mo for 3-5 y (3/2029), then annually based on patient’s risk of disease recurrence.  Vaginal cytology screening annually or as indicated for the detection of lower genital tract neoplasia.  Lab and imaging as indicated for concerning symptoms or exam findings. Patient education regarding symptoms of potential recurrence, lifestyle, obesity,   exercise, sexual health (eg, vaginal dilator use, lubricants/moisturizers, hormone therapy for menopause), smoking cessation, nutrition counseling, and potential long-term and late effects of treatment.  Declined UA/culture today.  We will plan for surveillance visit including Pap smear on return in 3 months. She is understanding to call with any changes in pelvic symptoms or general GYN concerns at any time between regularly scheduled visits.      -The patient and I have reviewed her Survivorship Care Plan in detail. We discussed her diagnosis, pathology, histology, all treatments, and ongoing surveillance recommendations. All questions were answered to her satisfaction. She is in agreement with our plan for ongoing surveillance as outlined in her plan. A copy of this document was provided to her at the completion of our visit.  A copy has also been sent to her primary care  provider.    -Pain assessment was performed today as a part of patient’s care. For patients with pain related to surgery, gynecologic malignancy or cancer treatment, the plan is as noted in the assessment/plan.  For patients with pain not related to these issues, they are to seek any further needed care from a more appropriate provider, such as PCP.    Situational depression, PHQ-9= 6  -Unfortunately the previously offered bi-monthly survivorship class led by L[facility] Phillips has recently been d/c'd.  I highly encouraged her to consider at least a one-time visit with [facility] upon completion of her treatment for cancer as it is normal to have emotional lability and certain concerns which she can very effectively address.  Agreeable to referral, and would prefer to coordinate appointment at the same time she returns here.  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  Discussed that studies show med management plus psychotherapy are more effective than either treatment alone.  Offered maintenance (such as SSRI) +/- PRN med (such as hydroxyzine) to start now if she does not wish to wait until 3m f/u when she sees Elizabeth. Declines.  Encouraged her to call me if she changes her mind or if symptoms persist/worsen before next appointment.    Diagnoses and all orders for this visit:    1. History of cervical cancer (Primary)  -     Ambulatory Referral to Behavioral Health    2. Situational depression  -     Ambulatory Referral to Behavioral Health      Advance Care Planning   ACP discussion was held with the patient during this visit. Patient does not have an advance directive, information provided.       I spent 45 minutes caring for Delia on this date of service. This time includes time spent by me in the following activities: preparing for the visit, reviewing tests, obtaining and/or reviewing a separately obtained history, performing a medically  appropriate examination and/or evaluation, counseling and educating the patient/family/caregiver, ordering medications, tests, or procedures, referring and communicating with other health care professionals, documenting information in the medical record, independently interpreting results and communicating that information with the patient/family/caregiver, and care coordination.       Follow Up   Return to clinic in 3 months for ongoing cancer surveillance.      Electronically signed by CASSIA Gomez on 07/31/2024

## 2024-10-23 ENCOUNTER — OFFICE VISIT (OUTPATIENT)
Dept: GYNECOLOGIC ONCOLOGY | Facility: CLINIC | Age: 49
End: 2024-10-23
Payer: COMMERCIAL

## 2024-10-23 VITALS
WEIGHT: 174 LBS | RESPIRATION RATE: 17 BRPM | TEMPERATURE: 97.7 F | HEART RATE: 77 BPM | DIASTOLIC BLOOD PRESSURE: 61 MMHG | SYSTOLIC BLOOD PRESSURE: 121 MMHG | OXYGEN SATURATION: 98 % | BODY MASS INDEX: 29.71 KG/M2 | HEIGHT: 64 IN

## 2024-10-23 DIAGNOSIS — Z85.41 HISTORY OF CERVICAL CANCER: Primary | ICD-10-CM

## 2024-10-23 NOTE — PROGRESS NOTES
GYN ONCOLOGY CANCER SURVEILLANCE FOLLOW-UP    Delia Ann  [id number]  1975    Subjective   Chief Complaint: Follow up, h/o cervical cancer       History of present illness:   Delia Ann is a 49 y.o. year old female who is here today for ongoing surveillance of Cervical Cancer, see Cancer History. She is now 7 months out since completion of treatment and doing well. She has no GYN complaints at this time. She denies vaginal bleeding and discharge. She does not have abdominal or pelvic pain. She is tolerating a regular diet and endorses a normal appetite. She denies nausea and vomiting. She denies early satiety and bloating. Denies any CP, SOB, lightheadedness or dizziness. She denies changes in her bowel/bladder. No dysuria, frequency, urgency, hematuria or flank pain. Reports normal energy levels. Repeat PAP due now. Left foot in immobilizer boot. Recently fell and twisted ankle while playing with her grandchild in the yard.       General GYN= Dr Roxanne White and Katelin Medina, [facility]. Last annual well woman exam completed 8/3/2023. Pap performed at this time= negative cytology. HR-HPV pool: positive. HPV 16, 18/45: negative.      Cancer History:   Oncology/Hematology History   Cervical cancer   8/2023 -  Other Event    She had a normal pap test in 8/2023, has always had normal pap tests. In 10/2023, she had an endometrial ablation for irregular bleeding. However, pathology from that procedure demonstrated some abnormal cells. She had a LEEP performed 2/15/2024 (delayed by illness). Pathology from this demonstrated invasive moderately differentiated squamous cell carcinoma. She has not had any bleeding since her ablation.      2/15/2024 Cancer Staged    Staging form: ONC CERVIX UTERI AJCC V9  - Clinical stage from 2/15/2024: FIGO Stage IA1 (cT1a1, cN0, cM0) - Signed by Alexia Rowe MD on 2/23/2024  Staging comments: Diagnosed on LEEP  2 mm with a 1 mm depth of invasion  PD-L1 CPS 90    "  2/23/2024 Initial Diagnosis    Cervical cancer    Path:  Final Diagnosis   UTERUS, CERVIX, BILATERAL FALLOPIAN TUBES:               Cervix:                  -Histologic changes expected at prior surgical site                  -Focal residual HSIL (JONATHAN III)                  -Negative for residual invasive squamous cell carcinoma (see comment)               Endometrium:                  -Features expected with prior ablation, negative for malignancy               Myometrium:                  -Intramural leiomyoma exhibiting reparative/ischemic type change               Adnexa:                  -Bilateral benign fallopian tubes  2.  VAGINAL MARGIN:               Benign squamous mucosa with endocervical type glands, negative for intraepithelial neoplasia/malignancy   Electronically signed by Estrellita Lucero MD on 4/3/2024 at 0917   Comment    No residual invasive squamous cell carcinoma is present in this current specimen.  Using the findings given on the patient's initial diagnostic LEEP excision (S ), this lesion will qualify as an AJCC stage: pT1a NX.        7/31/2024 Survivorship    Survivorship Care Plan completed and discussed with patient.  Copy of Survivorship Care Plan provided to patient and primary care provider.                         The current medication list and allergy list were reviewed and reconciled.     Past Medical History, Past Surgical History, Social History, Family History have been reviewed and are without significant changes except as mentioned.      Review of Systems   Constitutional: Negative.    Gastrointestinal: Negative.    Genitourinary: Negative.            Objective   Physical Exam  Musculoskeletal:      Left lower leg: Edema (left immobilizer boot, skin of left foot is swollen and bruised) present.       Vital Signs: /61   Pulse 77   Temp 97.7 °F (36.5 °C) (Temporal)   Resp 17   Ht 162.6 cm (64.02\")   Wt 78.9 kg (174 lb)   SpO2 98%   BMI 29.85 kg/m² " "  Vitals:    10/23/24 1509   PainSc: 0-No pain           General Appearance:  alert, cooperative, no apparent distress and appears stated age   Neurologic/Psych: A&O x 3, gait steady, appropriate affect   HEENT:  Normocephalic, without obvious abnormality, mucous membranes moist   Abdomen:   Soft, non-tender, non-distended, and no organomegaly   Lymph nodes: No cervical, supraclavicular, inguinal adenopathy noted   Pelvic: External Genitalia  without lesions or skin changes  Vagina  is pink, moist, without lesions.   Vaginal Cuff  Female Vaginal Cuff: smooth, intact, and without visible lesions. +pap collected  Uterus  surgically absent and no palpable masses  Ovaries  without palpable masses or fullness  Parametria  smooth  Rectovaginal  Female rectovaginal: deferred     ECOG score: 0             Result Review :     Tumor marker:  No results found for: \"\"    PHQ-9 Total Score:      Procedure Note:            Assessment and Plan:     -Delia Ann is a 49 y.o. female who has a history of a stage IA ovarian cancer s/p RA-TLH/BS by Dr Rowe. She retains her ovaries (treatment completed 3-).  She is currently without clinical evidence of disease. Signs of recurrent disease, such as abdominal bloating or distention, vaginal bleeding, abdominopelvic pain, urinary or bowel changes, and shortness of breath were reviewed. She was advised to follow up immediately if she develops any of the above symptoms. She was also reminded to maintain a healthy lifestyle with a well balanced diet, calcium and vitamin D for osteoporosis prevention, and exercise as well as continue with recommended health and cancer screening guidelines. A pap was performed today. She will follow-up in .      Diagnoses and all orders for this visit:    1. History of cervical cancer (Primary)  -     LIQUID-BASED PAP SMEAR WITH HPV GENOTYPING REGARDLESS OF INTERPRETATION (MIKE,COR,MAD); Future  -     LIQUID-BASED PAP SMEAR WITH HPV " GENOTYPING REGARDLESS OF INTERPRETATION (MIKE,COR,MAD)      Pain assessment was performed today as a part of patient’s care.  For patients with pain related to surgery, gynecologic malignancy or cancer treatment, the plan is as noted in the assessment/plan.  For patients with pain not related to these issues, they are to seek any further needed care from a more appropriate provider, such as PCP.    Follow-up:     Return to clinic in 6 months for ongoing cancer surveillance.      Electronically signed by CASSIA Gomez on 10/23/2024

## 2024-10-25 LAB — REF LAB TEST METHOD: NORMAL

## 2024-10-28 ENCOUNTER — TELEPHONE (OUTPATIENT)
Dept: GYNECOLOGIC ONCOLOGY | Facility: CLINIC | Age: 49
End: 2024-10-28
Payer: COMMERCIAL

## 2024-10-28 NOTE — TELEPHONE ENCOUNTER
RN second call to patient.  RN spoke with patient reporting a normal pap smear.  Patient appreciative of call.

## 2025-04-23 ENCOUNTER — OFFICE VISIT (OUTPATIENT)
Dept: GYNECOLOGIC ONCOLOGY | Facility: CLINIC | Age: 50
End: 2025-04-23
Payer: COMMERCIAL

## 2025-04-23 VITALS
HEART RATE: 75 BPM | HEIGHT: 64 IN | TEMPERATURE: 97.3 F | BODY MASS INDEX: 28.8 KG/M2 | OXYGEN SATURATION: 97 % | DIASTOLIC BLOOD PRESSURE: 67 MMHG | SYSTOLIC BLOOD PRESSURE: 113 MMHG | WEIGHT: 168.7 LBS | RESPIRATION RATE: 18 BRPM

## 2025-04-23 DIAGNOSIS — Z85.41 HISTORY OF CERVICAL CANCER: Primary | ICD-10-CM

## 2025-04-23 NOTE — PROGRESS NOTES
GYN ONCOLOGY CANCER SURVEILLANCE FOLLOW-UP    Delia Ann  [id number]  1975    Subjective   Chief Complaint: 6m follow up, cervical cancer       History of present illness:   Delia Ann is a 49 y.o. year old female who is here today for ongoing surveillance of Cervical Cancer, see Cancer History. She is now over 1 year out since completion of treatment and doing well.  She has no acute complaints or concerns at this time.  Denies any major changes in health since last visit.  She denies vaginal bleeding and discharge. She does not have abdominal or pelvic pain. She is tolerating a regular diet and endorses a normal appetite. She denies nausea and vomiting. She denies early satiety and bloating. Denies any CP, SOB, lightheadedness or dizziness. She denies changes in her bowel/bladder. No dysuria, frequency, urgency, hematuria or flank pain. Reports normal energy levels.   -Pap completed 10-23-24 and showed normal results, HPV negative as well.   - Since last visit she has become sexually active.  Reports that her first 2 episodes of intercourse she did experience vaginal bleeding and discomfort.  Symptoms resolved since that time.    -Patient works in the [employer] as a  -- lives in [place]    -General GYN= Dr Roxanne White and Katelin Medina, APRN. Last annual well woman exam completed 8/3/2023.   OBGYN History:  She is a .  She does not use HRT. She does not have a history of abnormal pap smears.      Cancer History:   Oncology/Hematology History   Cervical cancer   2023 -  Other Event    She had a normal pap test in 2023, has always had normal pap tests. In 10/2023, she had an endometrial ablation for irregular bleeding. However, pathology from that procedure demonstrated some abnormal cells. She had a LEEP performed 2/15/2024 (delayed by illness). Pathology from this demonstrated invasive moderately differentiated squamous cell carcinoma. She has not  had any bleeding since her ablation.      2/15/2024 Cancer Staged    Staging form: ONC CERVIX UTERI AJCC V9  - Clinical stage from 2/15/2024: FIGO Stage IA1 (cT1a1, cN0, cM0) - Signed by Alexia Rowe MD on 2/23/2024  Staging comments: Diagnosed on LEEP  2 mm with a 1 mm depth of invasion  PD-L1 CPS 90     2/23/2024 Initial Diagnosis    Cervical cancer    Path:  Final Diagnosis   UTERUS, CERVIX, BILATERAL FALLOPIAN TUBES:               Cervix:                  -Histologic changes expected at prior surgical site                  -Focal residual HSIL (JONATHAN III)                  -Negative for residual invasive squamous cell carcinoma (see comment)               Endometrium:                  -Features expected with prior ablation, negative for malignancy               Myometrium:                  -Intramural leiomyoma exhibiting reparative/ischemic type change               Adnexa:                  -Bilateral benign fallopian tubes  2.  VAGINAL MARGIN:               Benign squamous mucosa with endocervical type glands, negative for intraepithelial neoplasia/malignancy   Electronically signed by Estrellita Lucero MD on 4/3/2024 at 0917   Comment    No residual invasive squamous cell carcinoma is present in this current specimen.  Using the findings given on the patient's initial diagnostic LEEP excision (S ), this lesion will qualify as an AJCC stage: pT1a NX.        7/31/2024 Survivorship    Survivorship Care Plan completed and discussed with patient.  Copy of Survivorship Care Plan provided to patient and primary care provider.                         The current medication list and allergy list were reviewed and reconciled.     Past Medical History, Past Surgical History, Social History, Family History have been reviewed and are without significant changes except as mentioned.      Review of Systems   Constitutional: Negative.    Gastrointestinal: Negative.    Genitourinary: Negative.   "  Psychiatric/Behavioral: Negative.             Objective   Physical Exam  Vital Signs: /67   Pulse 75   Temp 97.3 °F (36.3 °C) (Temporal)   Resp 18   Ht 162.6 cm (64\")   Wt 76.5 kg (168 lb 11.2 oz)   SpO2 97%   BMI 28.96 kg/m²   Vitals:    25 1320   PainSc: 0-No pain           General Appearance:  alert, cooperative, no apparent distress, appears stated age, and normal weight   Neurologic/Psych: A&O x 3, gait steady, appropriate affect   HEENT:  Normocephalic, without obvious abnormality, mucous membranes moist   Abdomen:   Soft, non-tender, non-distended, and no organomegaly   Lymph nodes: No cervical, supraclavicular, inguinal adenopathy noted   Pelvic: External Genitalia  without lesions or skin changes  Vagina  is pink, moist, without lesions.   Vaginal Cuff  Female Vaginal Cuff: smooth, intact, and without visible lesions  Uterus  surgically absent and no palpable masses  Ovaries  without palpable masses or fullness  Parametria  smooth  Rectovaginal  Female rectovaginal: deferred     ECOG score: 0             Result Review :  [facility]  [address] [place] [address]  Phone: [phone] or [phone]  Fax: [phone]  Larry Chau M.D., Medical Director    PATIENT NAME                                     LABORATORY NO.  ANTONELLA RUIZ.                           [id number]  [id number]                                 AGE                    SEX   SSN              CLIENT REF #  [facility]                       49        1975      F     [id number]      [id number]    [address] [facility],  [id number]              REQUESTING M.D.           ATTENDING MJarred[facility] [address]                        JOHNY AGGARWAL  DATE COLLECTED            DATE RECEIVED          DATE REPORTED  10/23/2024                10/23/2024             10/25/2024    ThinPrep Pap with [facility]    DIAGNOSIS:  Negative for " intraepithelial lesion or malignancy    Multiple factors can influence accuracy of Pap tests; therefore, screening at  regular intervals is necessary for early cancer detection.      SPECIMEN ADEQUACY:  SATISFACTORY FOR EVALUATION  Partially obscuring blood and inflammation are present.  SOURCE OF SPECIMEN:       VAGINAL  SLIDES:  1  CLINICAL HISTORY:  History of cervical cancer    HPV  HR-HPV POOL: Negative    The Aptima HPV assay is an in vitro nucleic acid amplification test for the  qualitative detection of E6/E7 viral messenger RNA from 14 high risk types of  HPV in cervical specimens. The high risk HPV types detected include: 16, 18,  31, 33, 35, 39, 45, 51, 52, 56, 58, 59, 66, 68    CYTOTECHNOLOGIST:             ETB CT (ASCP)                 Assessment and Plan:     -Delia Ann is a 49 y.o. year old female who has a history of a Stage IAI squamous cell carcinoma of the cervix cervical cancer s/p RA-TLH/BS performed 3- by Dr Rowe (ovaries retained). She is currently without evidence of disease. Signs of recurrent disease, such as vaginal bleeding, abdominopelvic pain, urinary or bowel changes, and shortness of breath were reviewed with the patient. She was advised to follow up immediately if she develops any of the above symptoms. She was also reminded to maintain a healthy lifestyle with a well balanced diet, calcium and vitamin D for osteoporosis prevention, and exercise as well as continue with recommended health and cancer screening guidelines.  She will follow-up in 6m.    -repeat pap due 10/2025     Diagnoses and all orders for this visit:    1. History of cervical cancer (Primary)      Pain assessment was performed today as a part of patient’s care.  For patients with pain related to surgery, gynecologic malignancy or cancer treatment, the plan is as noted in the assessment/plan.  For patients with pain not related to these issues, they are to seek any further needed care from a  more appropriate provider, such as PCP.    Follow-up:     Return to clinic in 6 months for ongoing cancer surveillance and repeat pap smear.      Electronically signed by CASSIA Gomez on 04/23/2025

## (undated) DEVICE — UNDERGLV SURG BIOGEL INDICAT PF 61/2 GRN

## (undated) DEVICE — ANTIBACTERIAL UNDYED BRAIDED (POLYGLACTIN 910), SYNTHETIC ABSORBABLE SUTURE: Brand: COATED VICRYL

## (undated) DEVICE — TIP COVER ACCESSORY

## (undated) DEVICE — ARM DRAPE

## (undated) DEVICE — PK MAJ GYN DAVINCI 10

## (undated) DEVICE — SEAL

## (undated) DEVICE — TROC BLADLES ANCHORPORT/OPTI LP 8X120MM 1P/U

## (undated) DEVICE — GLV SURG SENSICARE PI MIC PF SZ6 LF STRL

## (undated) DEVICE — SCRB SURG BACTOSHIELD CHG 4PCT 4OZ

## (undated) DEVICE — NDL BLNT 18G 1 1/2IN

## (undated) DEVICE — MANIP UTER RUMI 2 KOH EFFICIENT 3.5CM BL

## (undated) DEVICE — BLADELESS OBTURATOR: Brand: WECK VISTA

## (undated) DEVICE — BNDR ABD PREMIUM/UNIV 10IN 27TO48IN

## (undated) DEVICE — SUT MNCRYL PLS ANTIB UD 3/0 PS2 27IN

## (undated) DEVICE — ST TBG AIRSEAL FLTR TRI LUM

## (undated) DEVICE — ADHS SKIN PREMIERPRO EXOFIN TOPICAL HI/VISC .5ML

## (undated) DEVICE — MANIP UTER RUMI TP 5.1MM 6CM LAV

## (undated) DEVICE — ANTIBACTERIAL UNDYED BRAIDED (POLYGLACTIN 910), SYNTHETIC ABSORBABLE SURGICAL SUTURE: Brand: COATED VICRYL

## (undated) DEVICE — COLUMN DRAPE

## (undated) DEVICE — PAD,ARMBOARD,CONV,FOAM,2X8X20",12PR/CS: Brand: MEDLINE

## (undated) DEVICE — LAPAROVUE VISIBILITY SYSTEM LAPAROSCOPIC SOLUTIONS: Brand: LAPAROVUE

## (undated) DEVICE — APPL CHLORAPREP TINTED 26ML TEAL